# Patient Record
Sex: FEMALE | Race: BLACK OR AFRICAN AMERICAN | NOT HISPANIC OR LATINO | Employment: FULL TIME | URBAN - METROPOLITAN AREA
[De-identification: names, ages, dates, MRNs, and addresses within clinical notes are randomized per-mention and may not be internally consistent; named-entity substitution may affect disease eponyms.]

---

## 2018-01-03 ENCOUNTER — HOSPITAL ENCOUNTER (EMERGENCY)
Facility: HOSPITAL | Age: 28
Discharge: HOME | End: 2018-01-03
Attending: EMERGENCY MEDICINE
Payer: COMMERCIAL

## 2018-01-03 VITALS
SYSTOLIC BLOOD PRESSURE: 138 MMHG | TEMPERATURE: 99 F | HEIGHT: 63 IN | BODY MASS INDEX: 33.66 KG/M2 | DIASTOLIC BLOOD PRESSURE: 84 MMHG | WEIGHT: 190 LBS | OXYGEN SATURATION: 98 % | RESPIRATION RATE: 18 BRPM | HEART RATE: 82 BPM

## 2018-01-03 DIAGNOSIS — R07.9 CHEST PAIN: ICD-10-CM

## 2018-01-03 DIAGNOSIS — M25.512 ACUTE PAIN OF LEFT SHOULDER: Primary | ICD-10-CM

## 2018-01-03 DIAGNOSIS — M79.602 PAIN OF LEFT UPPER EXTREMITY: ICD-10-CM

## 2018-01-03 LAB
B-HCG UR QL: NEGATIVE
CTP QC/QA: YES

## 2018-01-03 PROCEDURE — 96372 THER/PROPH/DIAG INJ SC/IM: CPT

## 2018-01-03 PROCEDURE — 63600175 PHARM REV CODE 636 W HCPCS: Performed by: PHYSICIAN ASSISTANT

## 2018-01-03 PROCEDURE — 93010 ELECTROCARDIOGRAM REPORT: CPT | Mod: ,,, | Performed by: INTERNAL MEDICINE

## 2018-01-03 PROCEDURE — 81025 URINE PREGNANCY TEST: CPT | Performed by: EMERGENCY MEDICINE

## 2018-01-03 PROCEDURE — 93005 ELECTROCARDIOGRAM TRACING: CPT

## 2018-01-03 PROCEDURE — 99284 EMERGENCY DEPT VISIT MOD MDM: CPT | Mod: 25

## 2018-01-03 RX ORDER — ORPHENADRINE CITRATE 30 MG/ML
30 INJECTION INTRAMUSCULAR; INTRAVENOUS
Status: COMPLETED | OUTPATIENT
Start: 2018-01-03 | End: 2018-01-03

## 2018-01-03 RX ORDER — METHOCARBAMOL 500 MG/1
1000 TABLET, FILM COATED ORAL 3 TIMES DAILY PRN
Qty: 30 TABLET | Refills: 0 | Status: SHIPPED | OUTPATIENT
Start: 2018-01-03 | End: 2018-01-08

## 2018-01-03 RX ORDER — IBUPROFEN 600 MG/1
600 TABLET ORAL EVERY 6 HOURS PRN
Qty: 20 TABLET | Refills: 0 | Status: SHIPPED | OUTPATIENT
Start: 2018-01-03 | End: 2018-01-08

## 2018-01-03 RX ORDER — KETOROLAC TROMETHAMINE 30 MG/ML
15 INJECTION, SOLUTION INTRAMUSCULAR; INTRAVENOUS
Status: COMPLETED | OUTPATIENT
Start: 2018-01-03 | End: 2018-01-03

## 2018-01-03 RX ADMIN — KETOROLAC TROMETHAMINE 15 MG: 30 INJECTION, SOLUTION INTRAMUSCULAR at 11:01

## 2018-01-03 RX ADMIN — ORPHENADRINE CITRATE 30 MG: 30 INJECTION INTRAMUSCULAR; INTRAVENOUS at 11:01

## 2018-01-03 NOTE — DISCHARGE INSTRUCTIONS
Please take Ibuprofen and Robaxin as prescribed. Do not take Robaxin prior to driving because it can cause drowsiness.     Follow up with primary care in 2 days.     Return to ER for worsening symptoms, new symptoms or as needed.

## 2018-01-03 NOTE — ED PROVIDER NOTES
"Encounter Date: 1/3/2018    SCRIBE #1 NOTE: I, Cherellemarci Brewer, am scribing for, and in the presence of,  Christie Garcia PA-C. I have scribed the following portions of the note - Other sections scribed: HPI and ROS.       History     Chief Complaint   Patient presents with    Arm Pain     left side arm pain radiating to chest, states "tingling pinching feeling." denies trauma or cardiac hx; states "I think I slept wrong, I slept on this (left) side"     CC: Arm Pain    HPI: This 27 y.o. female with no medical history presents to the ED c/o acute, constant, moderate (7/10) left arm pain radiating from the shoulder to the elbow as well as to the left chest (intermittent; last 30-40 seconds). Pt reports that she awoke this morning unable to move her left arm, noting that she thinks she may have slept on the arm wrong. She states that she has also been experiencing a tingling sensation to the left shoulder and back of the left arm as well as a "pinching tingling feeling" in the chest and adds that she also experiences a "sharp pain" (radiating from the left hand up the arm) when opening the palm of the left hand. She notes that she has experienced similar symptoms to the right side of the back in the past and adds that she has a history of inflammation in the chest (no longer takes medication), but notes that the present symptoms do not feel similar. Pt denies headache, dizziness, lightheadedness, shortness of breath, abdominal pain, nausea, emesis, sweats, leg pain or swelling, nasal congestion, ear pain, sore throat, fever and neck pain. No other associated symptoms. No alleviating factors.           The history is provided by the patient. No  was used.     Review of patient's allergies indicates:   Allergen Reactions    Tramadol Itching     History reviewed. No pertinent past medical history.  Past Surgical History:   Procedure Laterality Date    breast reduction       Family History " "  Problem Relation Age of Onset    Kidney disease Mother     Diabetes Mother     Hypertension Mother     Heart disease Father      Social History   Substance Use Topics    Smoking status: Never Smoker    Smokeless tobacco: Never Used    Alcohol use No     Review of Systems   Constitutional: Negative for fever.   HENT: Negative for sore throat.    Respiratory: Negative for shortness of breath.    Cardiovascular: Positive for chest pain.   Gastrointestinal: Negative for nausea.   Genitourinary: Negative for dysuria.   Musculoskeletal: Negative for back pain.        (+) left arm pain radiating from the left shoulder to the left elbow as well as the left chest    Skin: Negative for rash.   Neurological: Negative for dizziness, weakness, light-headedness and headaches.        (+) tingling sensation to the left shoulder and back of the left arm; (+) "pinching tingling feeling" to the left chest       Physical Exam     Initial Vitals [01/03/18 1015]   BP Pulse Resp Temp SpO2   138/87 89 20 98.5 °F (36.9 °C) 98 %      MAP       104         Physical Exam    Nursing note and vitals reviewed.  Constitutional: She appears well-developed and well-nourished.   HENT:   Head: Normocephalic.   Right Ear: External ear normal.   Left Ear: External ear normal.   Nose: Nose normal.   Mouth/Throat: Oropharynx is clear and moist.   Eyes: Conjunctivae are normal.   Cardiovascular: Normal rate and regular rhythm. Exam reveals no gallop and no friction rub.    No murmur heard.  Pulmonary/Chest: Breath sounds normal. She has no wheezes. She has no rhonchi. She has no rales.   Abdominal: Soft. Bowel sounds are normal. She exhibits no distension. There is no tenderness. There is no rebound, no guarding, no tenderness at McBurney's point and negative Lin's sign.   Musculoskeletal: Normal range of motion.   Diffuse TTP over L  Posterior shoulder and upper arm. Pt moving L arm actively to only 15 degrees in all directions. Passive ROM to " 45, limited due to pain. No erythema or edema.     TTP over left chest wall.    Lymphadenopathy:     She has no cervical adenopathy.   Neurological: She is alert. She has normal strength. No cranial nerve deficit or sensory deficit.   Skin: Skin is warm and dry.   Psychiatric: She has a normal mood and affect.         ED Course   Procedures  Labs Reviewed   POCT URINE PREGNANCY             Medical Decision Making:   Initial Assessment:   Patient is a 27-year-old female who presents for evaluation of constant left shoulder and upper arm pain and tingling and intermittent L sided chest pain. Pain worse with movement.  Patient does report history of similar intermittent chest pain.  She denies history of similar shoulder pains.  Patient is afebrile, well-appearing in no distress.  Exam findings above.  Based on exam I think this is likely musculoskeletal pain.  Patient given Toradol and Norflex in the ED with significant improvement of her symptoms and increased ROM of the left shoulder. X-ray negative for fracture or dislocation. Doubt septic arthritis or frozen shoulder. Patient reports she is no longer having chest pain.  Chest x-ray and EKG unremarkable.  Considered but doubt ACS, pneumothorax.  She discharged home in stable condition with ibuprofen and Robaxin.  PCP follow-up in 2 days.  ER return precautions discussed worsening symptoms or as needed.  Discussed this patient with Dr. Yi who agrees with assessment and plan            Scribe Attestation:   Scribe #1: I performed the above scribed service and the documentation accurately describes the services I performed. I attest to the accuracy of the note.    Attending Attestation:           Physician Attestation for Scribe:  Physician Attestation Statement for Scribe #1: I, Christie Garcia PA-C, reviewed documentation, as scribed by Cherelle Brewer in my presence, and it is both accurate and complete.                 ED Course      Clinical Impression:    The primary encounter diagnosis was Acute pain of left shoulder. Diagnoses of Chest pain and Pain of left upper extremity were also pertinent to this visit.                           Christie Garcia PA-C  01/04/18 1025

## 2018-01-03 NOTE — ED TRIAGE NOTES
"Pt with c/o left arm pain and stiffness that began last night while asleep. Pt states, "My arm felt numb, I couldn't move it. Then a sharp pinching pain went to my chest."  Pt reports pain to left shoulder increased that radiates to the back of her left upper extremity. Pt reports chest pain as intermittent with a pinching feeling, denies SOB, denies trauma to left arm.   "

## 2018-11-08 ENCOUNTER — HOSPITAL ENCOUNTER (EMERGENCY)
Facility: HOSPITAL | Age: 28
Discharge: HOME | End: 2018-11-08
Attending: EMERGENCY MEDICINE

## 2018-11-08 VITALS
RESPIRATION RATE: 15 BRPM | WEIGHT: 190 LBS | DIASTOLIC BLOOD PRESSURE: 68 MMHG | HEIGHT: 63 IN | TEMPERATURE: 98 F | HEART RATE: 69 BPM | SYSTOLIC BLOOD PRESSURE: 128 MMHG | OXYGEN SATURATION: 99 % | BODY MASS INDEX: 33.66 KG/M2

## 2018-11-08 DIAGNOSIS — R10.12 LUQ ABDOMINAL PAIN: ICD-10-CM

## 2018-11-08 DIAGNOSIS — T14.8XXA MUSCLE STRAIN: Primary | ICD-10-CM

## 2018-11-08 DIAGNOSIS — R06.02 SHORTNESS OF BREATH: ICD-10-CM

## 2018-11-08 LAB
ALBUMIN SERPL BCP-MCNC: 3.7 G/DL
ALP SERPL-CCNC: 68 U/L
ALT SERPL W/O P-5'-P-CCNC: 19 U/L
ANION GAP SERPL CALC-SCNC: 8 MMOL/L
AST SERPL-CCNC: 16 U/L
B-HCG UR QL: NEGATIVE
BACTERIA #/AREA URNS HPF: ABNORMAL /HPF
BILIRUB SERPL-MCNC: 0.4 MG/DL
BILIRUB UR QL STRIP: NEGATIVE
BUN SERPL-MCNC: 13 MG/DL
CALCIUM SERPL-MCNC: 9.4 MG/DL
CHLORIDE SERPL-SCNC: 106 MMOL/L
CLARITY UR: CLEAR
CO2 SERPL-SCNC: 26 MMOL/L
COLOR UR: YELLOW
CREAT SERPL-MCNC: 0.8 MG/DL
CTP QC/QA: YES
ERYTHROCYTE [DISTWIDTH] IN BLOOD BY AUTOMATED COUNT: 16 %
EST. GFR  (AFRICAN AMERICAN): >60 ML/MIN/1.73 M^2
EST. GFR  (NON AFRICAN AMERICAN): >60 ML/MIN/1.73 M^2
GLUCOSE SERPL-MCNC: 104 MG/DL
GLUCOSE UR QL STRIP: NEGATIVE
HCT VFR BLD AUTO: 36.2 %
HGB BLD-MCNC: 12.2 G/DL
HGB UR QL STRIP: ABNORMAL
KETONES UR QL STRIP: NEGATIVE
LEUKOCYTE ESTERASE UR QL STRIP: NEGATIVE
LIPASE SERPL-CCNC: 40 U/L
MCH RBC QN AUTO: 23.4 PG
MCHC RBC AUTO-ENTMCNC: 33.7 G/DL
MCV RBC AUTO: 70 FL
MICROSCOPIC COMMENT: ABNORMAL
NITRITE UR QL STRIP: POSITIVE
PH UR STRIP: 6 [PH] (ref 5–8)
PLATELET # BLD AUTO: 228 K/UL
PMV BLD AUTO: 10.5 FL
POTASSIUM SERPL-SCNC: 4.2 MMOL/L
PROT SERPL-MCNC: 7.4 G/DL
PROT UR QL STRIP: NEGATIVE
RBC # BLD AUTO: 5.21 M/UL
RBC #/AREA URNS HPF: 1 /HPF (ref 0–4)
SODIUM SERPL-SCNC: 140 MMOL/L
SP GR UR STRIP: 1.02 (ref 1–1.03)
SQUAMOUS #/AREA URNS HPF: 2 /HPF
URN SPEC COLLECT METH UR: ABNORMAL
UROBILINOGEN UR STRIP-ACNC: NEGATIVE EU/DL
WBC # BLD AUTO: 7.33 K/UL
WBC #/AREA URNS HPF: 3 /HPF (ref 0–5)

## 2018-11-08 PROCEDURE — 80053 COMPREHEN METABOLIC PANEL: CPT

## 2018-11-08 PROCEDURE — 93005 ELECTROCARDIOGRAM TRACING: CPT

## 2018-11-08 PROCEDURE — 25000003 PHARM REV CODE 250: Performed by: EMERGENCY MEDICINE

## 2018-11-08 PROCEDURE — 83690 ASSAY OF LIPASE: CPT

## 2018-11-08 PROCEDURE — 81025 URINE PREGNANCY TEST: CPT | Performed by: EMERGENCY MEDICINE

## 2018-11-08 PROCEDURE — 81000 URINALYSIS NONAUTO W/SCOPE: CPT

## 2018-11-08 PROCEDURE — 85027 COMPLETE CBC AUTOMATED: CPT

## 2018-11-08 PROCEDURE — 93010 ELECTROCARDIOGRAM REPORT: CPT | Mod: ,,, | Performed by: INTERNAL MEDICINE

## 2018-11-08 PROCEDURE — 99283 EMERGENCY DEPT VISIT LOW MDM: CPT

## 2018-11-08 RX ORDER — HYDROCODONE BITARTRATE AND ACETAMINOPHEN 10; 325 MG/1; MG/1
1 TABLET ORAL
Status: DISCONTINUED | OUTPATIENT
Start: 2018-11-08 | End: 2018-11-08 | Stop reason: HOSPADM

## 2018-11-08 RX ORDER — HYOSCYAMINE SULFATE 0.12 MG/1
0.12 TABLET SUBLINGUAL
Status: COMPLETED | OUTPATIENT
Start: 2018-11-08 | End: 2018-11-08

## 2018-11-08 RX ORDER — DIPHENHYDRAMINE HCL 25 MG
25 CAPSULE ORAL
Status: DISCONTINUED | OUTPATIENT
Start: 2018-11-08 | End: 2018-11-08 | Stop reason: HOSPADM

## 2018-11-08 RX ORDER — NITROFURANTOIN 25; 75 MG/1; MG/1
100 CAPSULE ORAL 2 TIMES DAILY
Qty: 10 CAPSULE | Refills: 0 | Status: SHIPPED | OUTPATIENT
Start: 2018-11-08 | End: 2018-11-13

## 2018-11-08 RX ORDER — HYDROCODONE BITARTRATE AND ACETAMINOPHEN 10; 325 MG/1; MG/1
1 TABLET ORAL EVERY 8 HOURS PRN
Qty: 5 TABLET | Refills: 0 | Status: SHIPPED | OUTPATIENT
Start: 2018-11-08 | End: 2022-03-03 | Stop reason: CLARIF

## 2018-11-08 RX ORDER — IBUPROFEN 400 MG/1
400 TABLET ORAL EVERY 6 HOURS PRN
Qty: 20 TABLET | Refills: 0 | Status: SHIPPED | OUTPATIENT
Start: 2018-11-08 | End: 2023-02-06

## 2018-11-08 RX ORDER — IBUPROFEN 400 MG/1
400 TABLET ORAL
Status: COMPLETED | OUTPATIENT
Start: 2018-11-08 | End: 2018-11-08

## 2018-11-08 RX ORDER — CYCLOBENZAPRINE HCL 10 MG
10 TABLET ORAL 3 TIMES DAILY PRN
Qty: 15 TABLET | Refills: 0 | Status: SHIPPED | OUTPATIENT
Start: 2018-11-08 | End: 2018-11-13

## 2018-11-08 RX ORDER — KETOROLAC TROMETHAMINE 30 MG/ML
15 INJECTION, SOLUTION INTRAMUSCULAR; INTRAVENOUS
Status: DISCONTINUED | OUTPATIENT
Start: 2018-11-08 | End: 2018-11-08

## 2018-11-08 RX ADMIN — IBUPROFEN 400 MG: 400 TABLET, FILM COATED ORAL at 07:11

## 2018-11-08 RX ADMIN — HYOSCYAMINE SULFATE 0.12 MG: 0.12 TABLET ORAL; SUBLINGUAL at 07:11

## 2018-11-08 NOTE — ED NOTES
Patient refused pain medication because [facility] [place] [facility].  Dr. Kingsley notified and will write a prescription for pain medication.

## 2018-11-08 NOTE — ED PROVIDER NOTES
Encounter Date: 11/8/2018    SCRIBE #1 NOTE: I, Yanet Stewart, am scribing for, and in the presence of,  Dr. Burnham. I have scribed the entire note.       History     Chief Complaint   Patient presents with    Shortness of Breath     Pt. c/o left sided abdominal pain that began 3 days. Denies N/V/D or dysuria. Pt. is worse with movement.     Amy Davis is a 27 y.o. female who  has no past medical history on file.    The patient presents to the ED due to left sided abdominal pain for the last 2 days.   Patient reports of exacerbating pain during movement and inspiratory breathing.   She denies fever, nausea, vomiting, diarrhea, or dysuria.   Patient reports of previous similar pain, but was not evaluated for it.   Patient reports she just dealt with the pain and her symptoms gradually resolved.   Denies any SOB to me.  Patient LMP is today.      The history is provided by the patient.     Review of patient's allergies indicates:   Allergen Reactions    Tramadol Itching     History reviewed. No pertinent past medical history.  Past Surgical History:   Procedure Laterality Date    breast reduction       Family History   Problem Relation Age of Onset    Kidney disease Mother     Diabetes Mother     Hypertension Mother     Heart disease Father      Social History     Tobacco Use    Smoking status: Never Smoker    Smokeless tobacco: Never Used   Substance Use Topics    Alcohol use: No    Drug use: No     Review of Systems   Constitutional: Negative for chills and fever.   HENT: Negative for sore throat.    Respiratory: Negative for cough and shortness of breath.    Cardiovascular: Negative for chest pain.   Gastrointestinal: Positive for abdominal pain. Negative for diarrhea, nausea and vomiting.   Genitourinary: Negative for dysuria, frequency and urgency.   Musculoskeletal: Negative for back pain.   Skin: Negative for rash and wound.   Neurological: Negative for syncope and weakness.   Hematological: Does  not bruise/bleed easily.   Psychiatric/Behavioral: Negative for agitation, behavioral problems and confusion.       Physical Exam     Initial Vitals [11/08/18 0423]   BP Pulse Resp Temp SpO2   120/76 83 16 98.1 °F (36.7 °C) --      MAP       --         Physical Exam    Nursing note and vitals reviewed.  Constitutional: She appears well-developed and well-nourished. She is not diaphoretic. No distress.   Speaking in full sentences   HENT:   Head: Normocephalic and atraumatic.   Right Ear: External ear normal.   Left Ear: External ear normal.   Nose: Nose normal.   Eyes: EOM are normal. Pupils are equal, round, and reactive to light.   Neck: No tracheal deviation present.   Cardiovascular: Normal rate, regular rhythm, normal heart sounds and intact distal pulses. Exam reveals no gallop and no friction rub.    No murmur heard.  Pulmonary/Chest: Breath sounds normal. No stridor. No respiratory distress. She has no wheezes. She has no rhonchi. She has no rales.   Lungs clear.   Abdominal: Soft. Bowel sounds are normal. She exhibits no distension and no mass. There is tenderness.   Mild left sided abdominal tenderness.  obese abdomen    Musculoskeletal: Normal range of motion. She exhibits no edema.   Neurological: She is alert and oriented to person, place, and time. No cranial nerve deficit. GCS score is 15. GCS eye subscore is 4. GCS verbal subscore is 5. GCS motor subscore is 6.   Skin: Skin is warm and dry. Capillary refill takes less than 2 seconds. No pallor.   Psychiatric: She has a normal mood and affect. Her behavior is normal. Thought content normal.         ED Course   Procedures  Labs Reviewed   CBC WITHOUT DIFFERENTIAL - Abnormal; Notable for the following components:       Result Value    Hematocrit 36.2 (*)     MCV 70 (*)     MCH 23.4 (*)     RDW 16.0 (*)     All other components within normal limits   URINALYSIS, REFLEX TO URINE CULTURE - Abnormal; Notable for the following components:    Occult Blood UA  1+ (*)     Nitrite, UA Positive (*)     All other components within normal limits    Narrative:     Preferred Collection Type->Urine, Clean Catch   URINALYSIS MICROSCOPIC - Abnormal; Notable for the following components:    Bacteria, UA Many (*)     All other components within normal limits    Narrative:     Preferred Collection Type->Urine, Clean Catch   COMPREHENSIVE METABOLIC PANEL   LIPASE   POCT URINE PREGNANCY          Imaging Results          US Pelvis Comp with Transvag NON-OB (xpd (Final result)  Result time 11/08/18 08:33:44    Final result by Ned Smith MD (11/08/18 08:33:44)                 Impression:      No acute abnormality.  Small amount of free fluid in the pelvis may be physiologic.      Electronically signed by: Ned Smith MD  Date:    11/08/2018  Time:    08:33             Narrative:    EXAMINATION:  US PELVIS COMP WITH TRANSVAG NON-OB (XPD)    CLINICAL HISTORY:  LUQ abdominal pain;    TECHNIQUE:  Transabdominal sonography of the pelvis was performed, followed by transvaginal sonography to better evaluate the uterus and ovaries.    COMPARISON:  CT renal stone study same date 07:01    FINDINGS:  Uterus:    *Size: 5.7 x 2.5 x 3.5 cm  *Masses: None  *Endometrium: 5 mm, normal in thickness for pre-menopausal state.  .    Right ovary:    *Size: 4.6 x 1.9 x 3.0 cm  *Appearance: Normal  *Vascular flow: Normal  .    Left ovary:    *Size: 3.6 x 2.1 x 2.8 cm  *Appearance: Normal  *Vascular Flow: Normal  .    Miscellaneous: Small amount of free fluid.                               CT Renal Stone Study ABD Pelvis WO (Final result)  Result time 11/08/18 07:24:35    Final result by Curt Poole MD (11/08/18 07:24:35)                 Impression:      1.  Mild asymmetric prominence of the left ovary with small volume of simple appearing free fluid within the pelvis.  Clinical correlation with patient's physiologic cycle and symptoms is advised.  Further evaluation with pelvic ultrasound as clinically  warranted.    2.  Otherwise, no CT evidence of acute intra-abdominal abnormality, noting normal appearance of the appendix.    3.  Heterogeneous appearance of the hepatic parenchyma which may relate to underlying hepatic steatosis.      Electronically signed by: Curt Poole MD  Date:    11/08/2018  Time:    07:24             Narrative:    EXAMINATION:  CT RENAL STONE STUDY ABD PELVIS WO    CLINICAL HISTORY:  Abdominal pain, unspecified;    TECHNIQUE:  Low dose axial images, sagittal and coronal reformations were obtained from the lung bases to the pubic symphysis.  Contrast was not administered.    COMPARISON:  None    FINDINGS:  The lung bases are unremarkable.  There is no pleural fluid present.  The visualized portions of the heart appear normal.    The liver appears heterogeneous with regions of decreased attenuation possibly relating to hepatic steatosis on this limited noncontrast exam.  The gallbladder shows no evidence of stones or pericholecystic fluid.  There is no intra-or extrahepatic biliary ductal dilatation.    The stomach, spleen, pancreas, and adrenal glands are unremarkable.    The kidneys are normal in size and location. There is no evidence of hydronephrosis or nephrolithiasis.  The urinary bladder demonstrates no significant abnormality. There is mild asymmetric prominence of the left ovary measuring approximately 3.8 cm.  There is a small volume of relatively simple appearing free fluid within the pelvis.  The right adnexa appears within normal limits.    The abdominal aorta is normal in course and caliber without significant atherosclerotic calcifications.The visualized loops of small and large bowel show no evidence of obstruction or inflammation.  The appendix appears within normal limits.  There is no free intraperitoneal air or portal venous gas.    The osseous structures demonstrate no acute abnormalities.  The extraperitoneal soft tissues are unremarkable.                                X-Ray Chest PA And Lateral (Final result)  Result time 11/08/18 07:25:26    Final result by Curt Poole MD (11/08/18 07:25:26)                 Impression:      No radiographic evidence of acute intrathoracic process.      Electronically signed by: Curt Poole MD  Date:    11/08/2018  Time:    07:25             Narrative:    EXAMINATION:  XR CHEST PA AND LATERAL    CLINICAL HISTORY:  Shortness of breath    TECHNIQUE:  PA and lateral views of the chest were performed.    COMPARISON:  None    FINDINGS:  The cardiomediastinal silhouette appears within normal limits.  The lungs are symmetrically aerated without evidence of focal airspace consolidation.  There is no pleural effusion or pneumothorax.  Visualized osseous structures appear intact.                                 Medical Decision Making:   Initial Assessment:   27 y.o female presents with multiple complaints including difficulty breathing, left sided abdominal pain. Vitals on exam are unremarkable. Will obtain basic labs, CXR and reassess.  Differential Diagnosis:   Gastroenteritis, gastritis, ulcer, cholecystitis, gallstones, pancreatitis, ileus, small bowel obstruction, appendicitis, constipation, testicular torsion      Independently Interpreted Test(s):   I have ordered and independently interpreted EKG Reading(s) - see prior notes  Clinical Tests:   Lab Tests: Ordered and Reviewed  The following lab test(s) were unremarkable: CBC, CMP and UPT  Radiological Study: Ordered and Reviewed  Medical Tests: Ordered and Reviewed  ED Management:  Upon re-evaluation, the patient's status has improved.  After complete ED evaluation, clinical impression is most consistent with LUQ abdominal pain, muscle strain. Imaging has been nondiagnostic.  At this time, I feel there is no emergent condition requiring further evaluation or admission. I believe the patient is stable for discharge from the ED. The patient and any additional family present were updated  with test results, overall clinical impression, and recommended further plan of care. All questions were answered. The patient expressed understanding and agreed with current plan for discharge with PCP follow-up within 1 week. Strict return precautions were provided, including N/V, return/worsening of current symptoms or any other concerns.                      ED Course as of Nov 08 0949   Thu Nov 08, 2018   0659 Patient is refusing IV  [LD]   0734 EKG with NSR, rate of 68 bpm. TWIs in leads V1 and III.  Normal conduction, normal intervals, normal ST segments.  No STEMI  [LD]   0902 Patient states that pain has improved.  Tolerating PO well.  [LD]      ED Course User Index  [LD] Lynne Nguyen MD     Clinical Impression:     1. Muscle strain  2. LUQ abdominal pain    Disposition:   Disposition: Discharged  Condition: Stable    I, Lynne Nguyen,  personally performed the services described in this documentation. All medical record entries made by the scribe were at my direction and in my presence.  I have reviewed the chart and agree that the record reflects my personal performance and is accurate and complete. Lynne Nguyen M.D. 9:49 AM11/08/2018                    Lynne Nguyen MD  11/08/18 0950

## 2020-11-12 ENCOUNTER — HOSPITAL ENCOUNTER (EMERGENCY)
Facility: HOSPITAL | Age: 30
Discharge: HOME | End: 2020-11-12
Attending: EMERGENCY MEDICINE

## 2020-11-12 VITALS
BODY MASS INDEX: 33.84 KG/M2 | RESPIRATION RATE: 20 BRPM | WEIGHT: 191 LBS | TEMPERATURE: 98 F | OXYGEN SATURATION: 98 % | DIASTOLIC BLOOD PRESSURE: 84 MMHG | HEIGHT: 63 IN | SYSTOLIC BLOOD PRESSURE: 126 MMHG | HEART RATE: 84 BPM

## 2020-11-12 DIAGNOSIS — M79.10 MUSCULAR PAIN: ICD-10-CM

## 2020-11-12 DIAGNOSIS — R10.9 FLANK PAIN: Primary | ICD-10-CM

## 2020-11-12 LAB
ALBUMIN SERPL BCP-MCNC: 4.2 G/DL (ref 3.5–5.2)
ALP SERPL-CCNC: 67 U/L (ref 55–135)
ALT SERPL W/O P-5'-P-CCNC: 21 U/L (ref 10–44)
ANION GAP SERPL CALC-SCNC: 9 MMOL/L (ref 8–16)
AST SERPL-CCNC: 17 U/L (ref 10–40)
B-HCG UR QL: NEGATIVE
BASOPHILS # BLD AUTO: 0.03 K/UL (ref 0–0.2)
BASOPHILS NFR BLD: 0.4 % (ref 0–1.9)
BILIRUB SERPL-MCNC: 0.5 MG/DL (ref 0.1–1)
BILIRUB UR QL STRIP: NEGATIVE
BUN SERPL-MCNC: 14 MG/DL (ref 6–20)
CALCIUM SERPL-MCNC: 9.8 MG/DL (ref 8.7–10.5)
CHLORIDE SERPL-SCNC: 102 MMOL/L (ref 95–110)
CLARITY UR: CLEAR
CO2 SERPL-SCNC: 29 MMOL/L (ref 23–29)
COLOR UR: YELLOW
CREAT SERPL-MCNC: 0.9 MG/DL (ref 0.5–1.4)
CTP QC/QA: YES
DIFFERENTIAL METHOD: ABNORMAL
EOSINOPHIL # BLD AUTO: 0.2 K/UL (ref 0–0.5)
EOSINOPHIL NFR BLD: 2 % (ref 0–8)
ERYTHROCYTE [DISTWIDTH] IN BLOOD BY AUTOMATED COUNT: 14.6 % (ref 11.5–14.5)
EST. GFR  (AFRICAN AMERICAN): >60 ML/MIN/1.73 M^2
EST. GFR  (NON AFRICAN AMERICAN): >60 ML/MIN/1.73 M^2
GLUCOSE SERPL-MCNC: 80 MG/DL (ref 70–110)
GLUCOSE UR QL STRIP: NEGATIVE
HCT VFR BLD AUTO: 41.5 % (ref 37–48.5)
HGB BLD-MCNC: 14.1 G/DL (ref 12–16)
HGB UR QL STRIP: NEGATIVE
IMM GRANULOCYTES # BLD AUTO: 0.01 K/UL (ref 0–0.04)
IMM GRANULOCYTES NFR BLD AUTO: 0.1 % (ref 0–0.5)
KETONES UR QL STRIP: ABNORMAL
LEUKOCYTE ESTERASE UR QL STRIP: NEGATIVE
LYMPHOCYTES # BLD AUTO: 2.6 K/UL (ref 1–4.8)
LYMPHOCYTES NFR BLD: 35.1 % (ref 18–48)
MCH RBC QN AUTO: 25.1 PG (ref 27–31)
MCHC RBC AUTO-ENTMCNC: 34 G/DL (ref 32–36)
MCV RBC AUTO: 74 FL (ref 82–98)
MONOCYTES # BLD AUTO: 0.5 K/UL (ref 0.3–1)
MONOCYTES NFR BLD: 7.2 % (ref 4–15)
NEUTROPHILS # BLD AUTO: 4.1 K/UL (ref 1.8–7.7)
NEUTROPHILS NFR BLD: 55.2 % (ref 38–73)
NITRITE UR QL STRIP: NEGATIVE
NRBC BLD-RTO: 0 /100 WBC
PH UR STRIP: 5 [PH] (ref 5–8)
PLATELET # BLD AUTO: 279 K/UL (ref 150–350)
PMV BLD AUTO: 11.3 FL (ref 9.2–12.9)
POTASSIUM SERPL-SCNC: 4 MMOL/L (ref 3.5–5.1)
PROT SERPL-MCNC: 8.2 G/DL (ref 6–8.4)
PROT UR QL STRIP: NEGATIVE
RBC # BLD AUTO: 5.61 M/UL (ref 4–5.4)
SODIUM SERPL-SCNC: 140 MMOL/L (ref 136–145)
SP GR UR STRIP: 1.02 (ref 1–1.03)
URN SPEC COLLECT METH UR: ABNORMAL
UROBILINOGEN UR STRIP-ACNC: NEGATIVE EU/DL
WBC # BLD AUTO: 7.46 K/UL (ref 3.9–12.7)

## 2020-11-12 PROCEDURE — 99285 EMERGENCY DEPT VISIT HI MDM: CPT | Mod: 25

## 2020-11-12 PROCEDURE — 81003 URINALYSIS AUTO W/O SCOPE: CPT

## 2020-11-12 PROCEDURE — 25000003 PHARM REV CODE 250: Performed by: NURSE PRACTITIONER

## 2020-11-12 PROCEDURE — 85025 COMPLETE CBC W/AUTO DIFF WBC: CPT

## 2020-11-12 PROCEDURE — 63600175 PHARM REV CODE 636 W HCPCS: Performed by: NURSE PRACTITIONER

## 2020-11-12 PROCEDURE — 96374 THER/PROPH/DIAG INJ IV PUSH: CPT

## 2020-11-12 PROCEDURE — 80053 COMPREHEN METABOLIC PANEL: CPT

## 2020-11-12 PROCEDURE — 96361 HYDRATE IV INFUSION ADD-ON: CPT

## 2020-11-12 PROCEDURE — 81025 URINE PREGNANCY TEST: CPT | Performed by: NURSE PRACTITIONER

## 2020-11-12 RX ORDER — KETOROLAC TROMETHAMINE 30 MG/ML
30 INJECTION, SOLUTION INTRAMUSCULAR; INTRAVENOUS
Status: COMPLETED | OUTPATIENT
Start: 2020-11-12 | End: 2020-11-12

## 2020-11-12 RX ORDER — CYCLOBENZAPRINE HCL 10 MG
10 TABLET ORAL 3 TIMES DAILY PRN
Qty: 9 TABLET | Refills: 0 | Status: SHIPPED | OUTPATIENT
Start: 2020-11-12 | End: 2020-11-15

## 2020-11-12 RX ORDER — KETOROLAC TROMETHAMINE 10 MG/1
10 TABLET, FILM COATED ORAL EVERY 6 HOURS
Qty: 12 TABLET | Refills: 0 | Status: SHIPPED | OUTPATIENT
Start: 2020-11-12 | End: 2020-11-15

## 2020-11-12 RX ADMIN — SODIUM CHLORIDE 1000 ML: 0.9 INJECTION, SOLUTION INTRAVENOUS at 11:11

## 2020-11-12 RX ADMIN — KETOROLAC TROMETHAMINE 30 MG: 30 INJECTION, SOLUTION INTRAMUSCULAR; INTRAVENOUS at 11:11

## 2020-11-12 NOTE — ED PROVIDER NOTES
Encounter Date: 11/12/2020       History     Chief Complaint   Patient presents with    Back Pain     pt c/o nontraumatic right flank pain x2 days; worse with movement and palpation; denies radiation of pain; denies any urinary complaints, no hx of renal stones     29 yr old female presents to the ER with reports of right flank pain that began 2 days ago however worsening this morning. Pt states she feels like something is stabbing her in the flank. No vag dc or bleeding. No hematuria reported. Denies fever. No hx of kidney stones. Denies any injury to the area. PMH of breast reduction otherwise all others negative.     The history is provided by the patient. No  was used.     Review of patient's allergies indicates:   Allergen Reactions    Tramadol Itching     No past medical history on file.  Past Surgical History:   Procedure Laterality Date    breast reduction       Family History   Problem Relation Age of Onset    Kidney disease Mother     Diabetes Mother     Hypertension Mother     Heart disease Father      Social History     Tobacco Use    Smoking status: Never Smoker    Smokeless tobacco: Never Used   Substance Use Topics    Alcohol use: No    Drug use: No     Review of Systems   Constitutional: Negative for fever.   Gastrointestinal: Negative for constipation, diarrhea, nausea and vomiting.   Genitourinary: Positive for flank pain. Negative for vaginal bleeding, vaginal discharge and vaginal pain.   All other systems reviewed and are negative.      Physical Exam     Initial Vitals [11/12/20 1032]   BP Pulse Resp Temp SpO2   126/84 84 20 97.7 °F (36.5 °C) 98 %      MAP       --         Physical Exam    Nursing note and vitals reviewed.  Constitutional: She appears well-developed and well-nourished.   HENT:   Head: Normocephalic.   Right Ear: Hearing and tympanic membrane normal.   Left Ear: Hearing and tympanic membrane normal.   Nose: Nose normal.   Mouth/Throat: Oropharynx is  clear and moist.   Eyes: Lids are normal. Pupils are equal, round, and reactive to light.   Neck: Normal range of motion. No neck rigidity.   Cardiovascular: Normal rate.   Pulmonary/Chest: Breath sounds normal. No respiratory distress. She has no wheezes. She has no rhonchi.   Abdominal: Soft. There is no abdominal tenderness. There is CVA tenderness.   CVA tenderness on the right only. No rash noted.    Musculoskeletal: Normal range of motion.   Neurological: She is alert and oriented to person, place, and time.   Skin: Skin is warm and dry. No rash noted.   Psychiatric: She has a normal mood and affect. Her behavior is normal. Judgment and thought content normal.         ED Course   Procedures  Labs Reviewed   URINALYSIS, REFLEX TO URINE CULTURE - Abnormal; Notable for the following components:       Result Value    Ketones, UA 1+ (*)     All other components within normal limits    Narrative:     Specimen Source->Urine   CBC W/ AUTO DIFFERENTIAL - Abnormal; Notable for the following components:    RBC 5.61 (*)     MCV 74 (*)     MCH 25.1 (*)     RDW 14.6 (*)     All other components within normal limits   COMPREHENSIVE METABOLIC PANEL   POCT URINE PREGNANCY          Imaging Results          CT Renal Stone Study ABD Pelvis WO (Final result)  Result time 11/12/20 12:02:51    Final result by Rodríguez Peñaloza MD (11/12/20 12:02:51)                 Impression:      1. No findings to suggest obstructive uropathy.  Please note, the urinary bladder is decompressed and may account for mild wall thickening however correlation with urinalysis is advised to exclude changes of cystitis.  2. Several additional findings above.      Electronically signed by: Rodríguez Peñaloza MD  Date:    11/12/2020  Time:    12:02             Narrative:    EXAMINATION:  CT RENAL STONE STUDY ABD PELVIS WO    CLINICAL HISTORY:  Flank pain, kidney stone suspected;    TECHNIQUE:  Low dose axial images, sagittal and coronal reformations were  obtained from the lung bases to the pubic symphysis.  Contrast was not administered.    COMPARISON:  11/08/2018    FINDINGS:  Images of the lower thorax are remarkable for minimal dependent atelectasis.    The liver, spleen, pancreas, gallbladder and right adrenal gland have a grossly unremarkable noncontrast appearance.  There is mild thickening of the left adrenal gland, nonspecific.  The stomach is decompressed.  There is no biliary dilation or ascites.  The pancreatic duct is not dilated.  No significant abdominal lymphadenopathy.    The kidneys have a grossly unremarkable noncontrast appearance without hydronephrosis or nephrolithiasis.  The bilateral ureters are unable to be followed in their entirety to the urinary bladder, no definite calculi seen or secondary findings to suggest obstructive uropathy.  The urinary bladder is decompressed.  The uterus and adnexa is grossly unremarkable noting small bilateral ovarian follicles.  There is a small amount of fluid in the deep pelvis.    There is moderate stool in the colon.  The terminal ileum and appendix are unremarkable.  The small bowel is unremarkable.  There are a few scattered shotty periaortic, pericaval, and mesenteric lymph nodes.  There are a few upper limit of normal caliber pericecal lymph nodes, stable.    Degenerative changes are noted of the spine.  No significant inguinal lymphadenopathy.                                 Medical Decision Making:   Initial Assessment:   29 yr old female presents to the ER with reports of right flank pain that began 2 days ago however worsening this morning. Pt states she feels like something is stabbing her in the flank. No vag dc or bleeding. No hematuria reported. Denies fever. No hx of kidney stones. Denies any injury to the area. PMH of breast reduction otherwise all others negative.     The history is provided by the patient. No  was used.     Clinical Tests:   Lab Tests: Ordered and  Reviewed  Radiological Study: Ordered and Reviewed  ED Management:  ED Course as of Nov 12 1225  Thu Nov 12, 2020  1219 Pt reports great improvement in pain after meds given. I have reviewed the results of testing of lab work, urine and ct. Pt notified of the incidental findings concerning the adrenal gland and lymph nodes. Follow up with pcp recommended. She will be treated with Toradol and flexeril at home and is stable for dc.     (DT)    ED Course User Index  (DT) Margaret Morrissey NP                Attending Attestation:     Physician Attestation Statement for NP/PA:   I discussed this assessment and plan of this patient with the NP/PA, but I did not personally examine the patient. The face to face encounter was performed by the NP/PA.                  ED Course as of Nov 12 1332   Thu Nov 12, 2020   1219 Pt reports great improvement in pain after meds given. I have reviewed the results of testing of lab work, urine and ct. Pt notified of the incidental findings concerning the adrenal gland and lymph nodes. Follow up with pcp recommended. She will be treated with Toradol and flexeril at home and is stable for dc.     [DT]   1228 CO2: 29 [DT]      ED Course User Index  [DT] Margaret Morrissey NP            Clinical Impression:     ICD-10-CM ICD-9-CM   1. Flank pain  R10.9 789.09   2. Muscular pain  M79.10 729.1                          ED Disposition Condition    Discharge Stable        ED Prescriptions     Medication Sig Dispense Start Date End Date Auth. Provider    cyclobenzaprine (FLEXERIL) 10 MG tablet Take 1 tablet (10 mg total) by mouth 3 (three) times daily as needed for Muscle spasms. 9 tablet 11/12/2020 11/15/2020 Margaret Morrissey NP    ketorolac (TORADOL) 10 mg tablet Take 1 tablet (10 mg total) by mouth every 6 (six) hours. for 3 days 12 tablet 11/12/2020 11/15/2020 Margaret Morrissey NP        Follow-up Information     Follow up With Specialties Details Why Contact Info    [facility]    Referral  sent to [facility]                                       Margaret Morrissey NP  11/12/20 1223       John Tran MD  11/12/20 [id number]

## 2020-11-13 ENCOUNTER — TELEPHONE (OUTPATIENT)
Dept: ADMINISTRATIVE | Facility: OTHER | Age: 30
End: 2020-11-13

## 2020-11-17 ENCOUNTER — TELEPHONE (OUTPATIENT)
Dept: ADMINISTRATIVE | Facility: OTHER | Age: 30
End: 2020-11-17

## 2021-04-15 ENCOUNTER — PATIENT MESSAGE (OUTPATIENT)
Dept: RESEARCH | Facility: HOSPITAL | Age: 31
End: 2021-04-15

## 2022-02-08 ENCOUNTER — HOSPITAL ENCOUNTER (OUTPATIENT)
Dept: RADIOLOGY | Facility: HOSPITAL | Age: 32
Discharge: HOME | End: 2022-02-08
Attending: FAMILY MEDICINE
Payer: MEDICAID

## 2022-02-08 ENCOUNTER — OFFICE VISIT (OUTPATIENT)
Dept: FAMILY MEDICINE | Facility: CLINIC | Age: 32
End: 2022-02-08
Payer: MEDICAID

## 2022-02-08 VITALS
HEIGHT: 63 IN | HEART RATE: 89 BPM | SYSTOLIC BLOOD PRESSURE: 118 MMHG | DIASTOLIC BLOOD PRESSURE: 72 MMHG | BODY MASS INDEX: 35.7 KG/M2 | WEIGHT: 201.5 LBS | OXYGEN SATURATION: 97 %

## 2022-02-08 DIAGNOSIS — Z76.89 ENCOUNTER TO ESTABLISH CARE WITH NEW DOCTOR: Primary | ICD-10-CM

## 2022-02-08 DIAGNOSIS — Z76.89 ENCOUNTER TO ESTABLISH CARE WITH NEW DOCTOR: ICD-10-CM

## 2022-02-08 DIAGNOSIS — Z01.818 PRE-OP EXAM: ICD-10-CM

## 2022-02-08 DIAGNOSIS — E66.9 OBESITY (BMI 30-39.9): ICD-10-CM

## 2022-02-08 PROCEDURE — 93010 EKG 12-LEAD: ICD-10-PCS | Mod: S$PBB,,, | Performed by: INTERNAL MEDICINE

## 2022-02-08 PROCEDURE — 99204 PR OFFICE/OUTPT VISIT, NEW, LEVL IV, 45-59 MIN: ICD-10-PCS | Mod: S$PBB,,, | Performed by: FAMILY MEDICINE

## 2022-02-08 PROCEDURE — 71046 X-RAY EXAM CHEST 2 VIEWS: CPT | Mod: 26,,, | Performed by: RADIOLOGY

## 2022-02-08 PROCEDURE — 93005 ELECTROCARDIOGRAM TRACING: CPT | Mod: PBBFAC,PO | Performed by: INTERNAL MEDICINE

## 2022-02-08 PROCEDURE — 71046 X-RAY EXAM CHEST 2 VIEWS: CPT | Mod: TC,FY

## 2022-02-08 PROCEDURE — 93010 ELECTROCARDIOGRAM REPORT: CPT | Mod: S$PBB,,, | Performed by: INTERNAL MEDICINE

## 2022-02-08 PROCEDURE — 99204 OFFICE O/P NEW MOD 45 MIN: CPT | Mod: S$PBB,,, | Performed by: FAMILY MEDICINE

## 2022-02-08 PROCEDURE — 99999 PR PBB SHADOW E&M-EST. PATIENT-LVL III: CPT | Mod: PBBFAC,,, | Performed by: FAMILY MEDICINE

## 2022-02-08 PROCEDURE — 99999 PR PBB SHADOW E&M-EST. PATIENT-LVL III: ICD-10-PCS | Mod: PBBFAC,,, | Performed by: FAMILY MEDICINE

## 2022-02-08 PROCEDURE — 71046 XR CHEST PA AND LATERAL: ICD-10-PCS | Mod: 26,,, | Performed by: RADIOLOGY

## 2022-02-08 PROCEDURE — 99213 OFFICE O/P EST LOW 20 MIN: CPT | Mod: PBBFAC,25,PO | Performed by: FAMILY MEDICINE

## 2022-02-08 NOTE — PROGRESS NOTES
(Portions of this note were dictated using voice recognition software and may contain dictation related errors in spelling/grammar/syntax not found on text review)    CC:   Chief Complaint   Patient presents with    [facility]       HPI: 31 y.o. female presented to [facility] and pre op examination. She is scheduled for breast reduction surgery in 3 weeks in [place], [place]. She has medical hx of chronic back pain and obesity, she reports losing weight with life style modification. She needs medical clearance and labs along with EKG and CXR for surgery. She denies any fever, chills,cough, SOB, chest pain,N/V,abdominal pain, changes in bowel habits, urine problems. She has no other concerns.     Past Medical History:   Diagnosis Date    Left lower quadrant pain 8/24/2016       Past Surgical History:   Procedure Laterality Date    breast reduction         Family History   Problem Relation Age of Onset    Kidney disease Mother     Diabetes Mother     Hypertension Mother     Heart disease Father        Social History     Tobacco Use    Smoking status: Never Smoker    Smokeless tobacco: Never Used   Substance Use Topics    Alcohol use: No    Drug use: No       Lab Results   Component Value Date    WBC 6.21 02/08/2022    HGB 13.9 02/08/2022    HCT 41.0 02/08/2022    MCV 71 (L) 02/08/2022     02/08/2022    CHOL 181 02/08/2022    TRIG 85 02/08/2022    HDL 41 02/08/2022    ALT 52 (H) 02/08/2022    AST 42 (H) 02/08/2022    BILITOT 0.8 02/08/2022    ALKPHOS 68 02/08/2022     02/08/2022    K 4.3 02/08/2022     02/08/2022    CREATININE 0.9 02/08/2022    ESTGFRAFRICA >60 02/08/2022    EGFRNONAA >60 02/08/2022    CALCIUM 10.5 02/08/2022    ALBUMIN 4.5 02/08/2022    BUN 12 02/08/2022    CO2 27 02/08/2022    INR 1.0 02/08/2022    HGBA1C 5.8 08/25/2016    LDLCALC 123.0 02/08/2022    GLU 78 02/08/2022             Vital signs reviewed  PE:   APPEARANCE: Well nourished, well developed, in no acute  distress.    HEAD: Normocephalic, atraumatic.  EYES: EOMI.  Conjunctivae noninjected.  NOSE: Mucosa pink. Airway clear.  MOUTH & THROAT: No tonsillar enlargement. No pharyngeal erythema or exudate.   NECK: Supple with no cervical lymphadenopathy.    CHEST: Good inspiratory effort. Lungs clear to auscultation with no wheezes or crackles.  CARDIOVASCULAR: Normal S1, S2. No rubs, murmurs, or gallops.  ABDOMEN: Bowel sounds normal. Not distended. Soft. No tenderness or masses. No organomegaly.  EXTREMITIES: No edema, cyanosis, or clubbing.    Review of Systems   Constitutional: Negative for chills, fatigue and fever.   HENT: Negative.    Respiratory: Negative for cough, shortness of breath and wheezing.    Cardiovascular: Negative for chest pain, palpitations and leg swelling.   Gastrointestinal: Negative.    Genitourinary: Negative.    Musculoskeletal: Negative.    Neurological: Negative.    Psychiatric/Behavioral: Negative.    All other systems reviewed and are negative.      IMPRESSION  1. Encounter to establish care with new doctor    2. Pre-op exam    3. Obesity (BMI 30-39.9)    4. Macromastia        PLAN      1. Encounter to establish care with new doctor  - CBC Auto Differential; Future  - Comprehensive Metabolic Panel; Future  - Lipid Panel; Future  - TSH; Future  - X-Ray Chest PA And Lateral; Future  - Protime-INR; Future  - APTT; Future  - URINALYSIS; Future      2. Pre-op exam  - CBC Auto Differential; Future  - Comprehensive Metabolic Panel; Future  - TSH; Future  - IN OFFICE EKG 12-LEAD (to Muse)  - Hepatitis C Antibody; Future  - HIV 1 / 2 ANTIBODY; Future  - HCG, Quantitative; Future    ACC/AHA 2007 Guidelines for clearance    Step 1: No need for emergency non cardiac surgery  Step 2: No active cardiac conditions  Step 3: No low risk surgery  Step4: Yes - Functional capacity greater than or equal to 4 METs without symptoms - YES - Class IIa, ALEJADNRO B - Proceed with surgery    She is Low to intermediate risk  for surgery. She is medically stable for surgery.      3. Obesity (BMI 30-39.9)  BMI 35  Counseling provided on healthy lifestyle, encouraged to do moderate intensity regular exercise 30 minutes every day 5 days a week, to include vegetables and fruits and cut back on saturated fats and carbohydrates.         SCREENINGS      Immunizations:   needs flu vaccine      Age/demographic appropriate health maintenance:  Needs pap smear      Chacha Shaikh   2/8/2022

## 2022-02-10 ENCOUNTER — TELEPHONE (OUTPATIENT)
Dept: FAMILY MEDICINE | Facility: CLINIC | Age: 32
End: 2022-02-10
Payer: MEDICAID

## 2022-02-10 NOTE — TELEPHONE ENCOUNTER
----- Message from Makayla Neal sent at 2/10/2022  2:51 PM CST -----  Regarding: Results  Type:  Test Results    Who Called: pt    Name of Test (Lab/Mammo/Etc): lab/x ray    Date of Test: 2-8-22    Ordering Provider: Neel    Where the test was performed: [facility]    Would the patient rather a call back or a response via [facility]? Call    Best Call Back Number: [phone]

## 2022-02-10 NOTE — TELEPHONE ENCOUNTER
----- Message from Ana Laura Wong sent at 2/10/2022 12:27 PM CST -----  Regarding: results  Contact: [phone]  Type:  Test Results    Who Called:  self   Name of Test (Lab/Mammo/Etc):  labs, UA and xray for surgery clearance  Date of Test:  02/08  Ordering Provider:    Where the test was performed:  [facility]  Would the patient rather a call back or a response via [facility]?  call  Best Call Back Number:  [phone]  Additional Information:

## 2022-02-10 NOTE — TELEPHONE ENCOUNTER
----- Message from Telma Joe sent at 2/10/2022  3:21 PM CST -----  Contact: Pt  Type:  Test Results    Who Called: pt  Name of Test (Lab/Mammo/Etc): lab  Date of Test: 02/08  Ordering Provider: Dr Shaikh  Where the test was performed: [facility]  Would the patient rather a call back or a response via [facility]? Call   Best Call Back Number: [phone]  Additional Information:      Pt stated she needs this info for procedure

## 2022-02-11 ENCOUNTER — PATIENT MESSAGE (OUTPATIENT)
Dept: FAMILY MEDICINE | Facility: CLINIC | Age: 32
End: 2022-02-11
Payer: MEDICAID

## 2022-02-11 ENCOUNTER — TELEPHONE (OUTPATIENT)
Dept: FAMILY MEDICINE | Facility: CLINIC | Age: 32
End: 2022-02-11
Payer: MEDICAID

## 2022-02-11 NOTE — TELEPHONE ENCOUNTER
----- Message from Ana Laura Wong sent at 2/11/2022  7:32 AM CST -----  Regarding: surgery clearance  Contact: [phone]  Patient is requesting a call back regarding needing her surgery clearance sent to the doctor. The paper work was emailed from her and the Dr's office requesting clearance also.   Would the patient rather a call back or a response via [facility]?  Call   Best Call Back Number:  [phone]  Additional Information:  her deadline is today

## 2022-02-16 ENCOUNTER — PATIENT MESSAGE (OUTPATIENT)
Dept: FAMILY MEDICINE | Facility: CLINIC | Age: 32
End: 2022-02-16
Payer: MEDICAID

## 2022-02-16 ENCOUNTER — TELEPHONE (OUTPATIENT)
Dept: FAMILY MEDICINE | Facility: CLINIC | Age: 32
End: 2022-02-16
Payer: MEDICAID

## 2022-02-16 DIAGNOSIS — Z01.818 PRE-OP EVALUATION: Primary | ICD-10-CM

## 2022-02-17 ENCOUNTER — PATIENT MESSAGE (OUTPATIENT)
Dept: FAMILY MEDICINE | Facility: CLINIC | Age: 32
End: 2022-02-17
Payer: MEDICAID

## 2022-02-17 ENCOUNTER — LAB VISIT (OUTPATIENT)
Dept: LAB | Facility: HOSPITAL | Age: 32
End: 2022-02-17
Attending: FAMILY MEDICINE
Payer: MEDICAID

## 2022-02-17 DIAGNOSIS — Z01.818 PRE-OP EVALUATION: ICD-10-CM

## 2022-02-17 LAB
ESTIMATED AVG GLUCOSE: 120 MG/DL (ref 68–131)
HBA1C MFR BLD: 5.8 % (ref 4–5.6)

## 2022-02-17 PROCEDURE — 36415 COLL VENOUS BLD VENIPUNCTURE: CPT | Performed by: FAMILY MEDICINE

## 2022-02-17 PROCEDURE — 83036 HEMOGLOBIN GLYCOSYLATED A1C: CPT | Performed by: FAMILY MEDICINE

## 2022-03-03 ENCOUNTER — HOSPITAL ENCOUNTER (OUTPATIENT)
Facility: HOSPITAL | Age: 32
Discharge: HOME | End: 2022-03-04
Attending: EMERGENCY MEDICINE | Admitting: HOSPITALIST
Payer: MEDICAID

## 2022-03-03 DIAGNOSIS — R00.0 TACHYCARDIA: ICD-10-CM

## 2022-03-03 DIAGNOSIS — D62 ACUTE BLOOD LOSS ANEMIA: ICD-10-CM

## 2022-03-03 DIAGNOSIS — D64.9 ANEMIA, UNSPECIFIED TYPE: Primary | ICD-10-CM

## 2022-03-03 LAB
ABO GROUP BLD: NORMAL
ALBUMIN SERPL BCP-MCNC: 3.1 G/DL (ref 3.5–5.2)
ALP SERPL-CCNC: 49 U/L (ref 55–135)
ALT SERPL W/O P-5'-P-CCNC: 48 U/L (ref 10–44)
ANION GAP SERPL CALC-SCNC: 7 MMOL/L (ref 8–16)
AST SERPL-CCNC: 40 U/L (ref 10–40)
BASOPHILS # BLD AUTO: 0.04 K/UL (ref 0–0.2)
BASOPHILS NFR BLD: 0.4 % (ref 0–1.9)
BILIRUB SERPL-MCNC: 0.7 MG/DL (ref 0.1–1)
BILIRUB UR QL STRIP: NEGATIVE
BLD GP AB SCN CELLS X3 SERPL QL: NORMAL
BUN SERPL-MCNC: 14 MG/DL (ref 6–20)
CALCIUM SERPL-MCNC: 8.8 MG/DL (ref 8.7–10.5)
CHLORIDE SERPL-SCNC: 105 MMOL/L (ref 95–110)
CLARITY UR: CLEAR
CO2 SERPL-SCNC: 26 MMOL/L (ref 23–29)
COLOR UR: COLORLESS
CREAT SERPL-MCNC: 0.8 MG/DL (ref 0.5–1.4)
CTP QC/QA: YES
DIFFERENTIAL METHOD: ABNORMAL
EOSINOPHIL # BLD AUTO: 0.2 K/UL (ref 0–0.5)
EOSINOPHIL NFR BLD: 1.8 % (ref 0–8)
ERYTHROCYTE [DISTWIDTH] IN BLOOD BY AUTOMATED COUNT: 15.9 % (ref 11.5–14.5)
EST. GFR  (AFRICAN AMERICAN): >60 ML/MIN/1.73 M^2
EST. GFR  (NON AFRICAN AMERICAN): >60 ML/MIN/1.73 M^2
GLUCOSE SERPL-MCNC: 95 MG/DL (ref 70–110)
GLUCOSE UR QL STRIP: NEGATIVE
HCG INTACT+B SERPL-ACNC: 3.5 MIU/ML
HCT VFR BLD AUTO: 21.8 % (ref 37–48.5)
HGB BLD-MCNC: 7.5 G/DL (ref 12–16)
HGB UR QL STRIP: NEGATIVE
IMM GRANULOCYTES # BLD AUTO: 0.35 K/UL (ref 0–0.04)
IMM GRANULOCYTES NFR BLD AUTO: 3.2 % (ref 0–0.5)
KETONES UR QL STRIP: NEGATIVE
LACTATE SERPL-SCNC: 1.8 MMOL/L (ref 0.5–2.2)
LEUKOCYTE ESTERASE UR QL STRIP: NEGATIVE
LIPASE SERPL-CCNC: 27 U/L (ref 4–60)
LYMPHOCYTES # BLD AUTO: 2.7 K/UL (ref 1–4.8)
LYMPHOCYTES NFR BLD: 24.8 % (ref 18–48)
MCH RBC QN AUTO: 24.7 PG (ref 27–31)
MCHC RBC AUTO-ENTMCNC: 34.4 G/DL (ref 32–36)
MCV RBC AUTO: 72 FL (ref 82–98)
MONOCYTES # BLD AUTO: 0.9 K/UL (ref 0.3–1)
MONOCYTES NFR BLD: 7.9 % (ref 4–15)
NEUTROPHILS # BLD AUTO: 6.8 K/UL (ref 1.8–7.7)
NEUTROPHILS NFR BLD: 61.9 % (ref 38–73)
NITRITE UR QL STRIP: NEGATIVE
NRBC BLD-RTO: 1 /100 WBC
PH UR STRIP: 7 [PH] (ref 5–8)
PLATELET # BLD AUTO: 387 K/UL (ref 150–450)
PMV BLD AUTO: 9.6 FL (ref 9.2–12.9)
POTASSIUM SERPL-SCNC: 4.9 MMOL/L (ref 3.5–5.1)
PROT SERPL-MCNC: 6 G/DL (ref 6–8.4)
PROT UR QL STRIP: NEGATIVE
RBC # BLD AUTO: 3.04 M/UL (ref 4–5.4)
RH BLD: NORMAL
SARS-COV-2 RDRP RESP QL NAA+PROBE: NEGATIVE
SODIUM SERPL-SCNC: 138 MMOL/L (ref 136–145)
SP GR UR STRIP: >1.03 (ref 1–1.03)
URN SPEC COLLECT METH UR: ABNORMAL
UROBILINOGEN UR STRIP-ACNC: NEGATIVE EU/DL
WBC # BLD AUTO: 11.04 K/UL (ref 3.9–12.7)

## 2022-03-03 PROCEDURE — 83605 ASSAY OF LACTIC ACID: CPT | Performed by: NURSE PRACTITIONER

## 2022-03-03 PROCEDURE — 25000003 PHARM REV CODE 250: Performed by: NURSE PRACTITIONER

## 2022-03-03 PROCEDURE — 81003 URINALYSIS AUTO W/O SCOPE: CPT | Performed by: NURSE PRACTITIONER

## 2022-03-03 PROCEDURE — 83690 ASSAY OF LIPASE: CPT | Performed by: NURSE PRACTITIONER

## 2022-03-03 PROCEDURE — 99285 EMERGENCY DEPT VISIT HI MDM: CPT | Mod: 25

## 2022-03-03 PROCEDURE — 93010 EKG 12-LEAD: ICD-10-PCS | Mod: ,,, | Performed by: INTERNAL MEDICINE

## 2022-03-03 PROCEDURE — 63600175 PHARM REV CODE 636 W HCPCS: Performed by: EMERGENCY MEDICINE

## 2022-03-03 PROCEDURE — G0378 HOSPITAL OBSERVATION PER HR: HCPCS

## 2022-03-03 PROCEDURE — 96374 THER/PROPH/DIAG INJ IV PUSH: CPT | Mod: 59

## 2022-03-03 PROCEDURE — 86850 RBC ANTIBODY SCREEN: CPT | Performed by: EMERGENCY MEDICINE

## 2022-03-03 PROCEDURE — 80053 COMPREHEN METABOLIC PANEL: CPT | Performed by: NURSE PRACTITIONER

## 2022-03-03 PROCEDURE — 84702 CHORIONIC GONADOTROPIN TEST: CPT | Performed by: NURSE PRACTITIONER

## 2022-03-03 PROCEDURE — 25500020 PHARM REV CODE 255: Performed by: EMERGENCY MEDICINE

## 2022-03-03 PROCEDURE — 86901 BLOOD TYPING SEROLOGIC RH(D): CPT | Performed by: EMERGENCY MEDICINE

## 2022-03-03 PROCEDURE — U0002 COVID-19 LAB TEST NON-CDC: HCPCS | Performed by: NURSE PRACTITIONER

## 2022-03-03 PROCEDURE — 85025 COMPLETE CBC W/AUTO DIFF WBC: CPT | Performed by: NURSE PRACTITIONER

## 2022-03-03 PROCEDURE — 93010 ELECTROCARDIOGRAM REPORT: CPT | Mod: ,,, | Performed by: INTERNAL MEDICINE

## 2022-03-03 PROCEDURE — 96376 TX/PRO/DX INJ SAME DRUG ADON: CPT

## 2022-03-03 PROCEDURE — 93005 ELECTROCARDIOGRAM TRACING: CPT

## 2022-03-03 PROCEDURE — 86900 BLOOD TYPING SEROLOGIC ABO: CPT | Performed by: EMERGENCY MEDICINE

## 2022-03-03 PROCEDURE — 96361 HYDRATE IV INFUSION ADD-ON: CPT

## 2022-03-03 RX ORDER — MORPHINE SULFATE 4 MG/ML
4 INJECTION, SOLUTION INTRAMUSCULAR; INTRAVENOUS
Status: COMPLETED | OUTPATIENT
Start: 2022-03-03 | End: 2022-03-03

## 2022-03-03 RX ORDER — SODIUM CHLORIDE 0.9 % (FLUSH) 0.9 %
10 SYRINGE (ML) INJECTION
Status: DISCONTINUED | OUTPATIENT
Start: 2022-03-03 | End: 2022-03-04 | Stop reason: HOSPADM

## 2022-03-03 RX ORDER — ACETAMINOPHEN 325 MG/1
650 TABLET ORAL EVERY 4 HOURS PRN
Status: DISCONTINUED | OUTPATIENT
Start: 2022-03-03 | End: 2022-03-04 | Stop reason: HOSPADM

## 2022-03-03 RX ORDER — CEPHALEXIN 500 MG/1
500 CAPSULE ORAL EVERY 6 HOURS
COMMUNITY

## 2022-03-03 RX ORDER — ONDANSETRON 2 MG/ML
4 INJECTION INTRAMUSCULAR; INTRAVENOUS EVERY 8 HOURS PRN
Status: DISCONTINUED | OUTPATIENT
Start: 2022-03-03 | End: 2022-03-04 | Stop reason: HOSPADM

## 2022-03-03 RX ADMIN — SODIUM CHLORIDE 1000 ML: 0.9 INJECTION, SOLUTION INTRAVENOUS at 02:03

## 2022-03-03 RX ADMIN — MORPHINE SULFATE 4 MG: 4 INJECTION INTRAVENOUS at 03:03

## 2022-03-03 RX ADMIN — MORPHINE SULFATE 4 MG: 4 INJECTION INTRAVENOUS at 02:03

## 2022-03-03 RX ADMIN — IOHEXOL 100 ML: 350 INJECTION, SOLUTION INTRAVENOUS at 04:03

## 2022-03-03 NOTE — Clinical Note
Diagnosis: Acute blood loss anemia [049598]   Future Attending Provider: JOSÉ MIGUEL BLANTON [[id number]]   Admitting Provider:: JOSÉ MIGUEL BLANTON [[id number]]

## 2022-03-03 NOTE — FIRST PROVIDER EVALUATION
"Medical screening exam completed.  I have conducted a focused provider triage encounter, findings are as follows:    Brief history of present illness:  Reports abdominal lipo in [place] 2/24. Over the past few days, has noticed increased abd distention and states that she "feels really bad."  Denies fever and vomiting.    Vitals:    03/03/22 1231   BP: 126/71   BP Location: Left arm   Patient Position: Standing   Pulse: (!) 126   Resp: 20   Temp: 98.5 °F (36.9 °C)   TempSrc: Oral   SpO2: 100%   Weight: 93 kg (205 lb)   Height: 5' 3" (1.6 m)       Pertinent physical exam:  Ill-appearing. Tachycardic.     Brief workup plan:  Labs, IV fluid bolus    Preliminary workup initiated; this workup will be continued and followed by the physician or advanced practice provider that is assigned to the patient when roomed.  "

## 2022-03-03 NOTE — ED PROVIDER NOTES
"Encounter Date: 3/3/2022    SCRIBE #1 NOTE: I, Alina Pope, am scribing for, and in the presence of, Ye Mendoza MD.       History     Chief Complaint   Patient presents with    Abdominal Pain     Pt had BBL in [place] on 2/24 and flew home yesterday. Pt woke up this morning with tightness to abdomen, finger numbness, headache and dizziness.        31-year-old female presents to the emergency department for evaluation of abdominal pain that onset this morning. She notes associated dizziness and bilateral hand numbness. She describes her abdominal pain as uncomfortable and feels like she is "full of fluid". She reports having a lipo 360 abdominal surgery and brazilian butt lift on 02/24/2022 in [place], [place]. She states she flew back to [place] yesterday after being in a recovery facility. She mentions she had an appointment for a sculpting massage but missed it due to severe abdominal pain. She denies any fever, chills, nausea, vomiting or diarrhea.  She has no other complaints at this time.      The history is provided by the patient.     Review of patient's allergies indicates:  No Known Allergies  Past Medical History:   Diagnosis Date    Left lower quadrant pain 8/24/2016     Past Surgical History:   Procedure Laterality Date    breast reduction      LIPOSUCTION       Family History   Problem Relation Age of Onset    Kidney disease Mother     Diabetes Mother     Hypertension Mother     Heart disease Father      Social History     Tobacco Use    Smoking status: Never Smoker    Smokeless tobacco: Never Used   Substance Use Topics    Alcohol use: No    Drug use: No     Review of Systems   Constitutional: Negative for fever.   HENT: Negative for sore throat.    Respiratory: Negative for shortness of breath.    Cardiovascular: Negative for chest pain.   Gastrointestinal: Positive for abdominal pain. Negative for nausea and vomiting.   Genitourinary: Negative for dysuria.   Musculoskeletal: Negative " for back pain.   Skin: Negative for rash.   Neurological: Positive for dizziness and numbness (bilateral hand). Negative for weakness.   Hematological: Does not bruise/bleed easily.       Physical Exam     Initial Vitals [03/03/22 1231]   BP Pulse Resp Temp SpO2   126/71 (!) 126 20 98.5 °F (36.9 °C) 100 %      MAP       --         Physical Exam    Nursing note and vitals reviewed.  HENT:   Head: Atraumatic.   Eyes: Conjunctivae and EOM are normal.   Neck:   Normal range of motion.  Cardiovascular: Exam reveals no gallop and no friction rub.    No murmur heard.  tachycardic   Pulmonary/Chest: Breath sounds normal. No respiratory distress.   Abdominal: Abdomen is soft. There is no abdominal tenderness.   Musculoskeletal:      Cervical back: Normal range of motion.     Neurological: She is alert and oriented to person, place, and time.   Skin:   Scattered ecchymosis on abdomen and bilateral buttocks/hips with firmness noted to pannus as well as buttocks   Psychiatric: She has a normal mood and affect.         ED Course   Procedures  Labs Reviewed   CBC W/ AUTO DIFFERENTIAL - Abnormal; Notable for the following components:       Result Value    RBC 3.04 (*)     Hemoglobin 7.5 (*)     Hematocrit 21.8 (*)     MCV 72 (*)     MCH 24.7 (*)     RDW 15.9 (*)     Immature Granulocytes 3.2 (*)     Immature Grans (Abs) 0.35 (*)     nRBC 1 (*)     All other components within normal limits   COMPREHENSIVE METABOLIC PANEL - Abnormal; Notable for the following components:    Albumin 3.1 (*)     Alkaline Phosphatase 49 (*)     ALT 48 (*)     Anion Gap 7 (*)     All other components within normal limits   LACTIC ACID, PLASMA   LIPASE   HCG, QUANTITATIVE   HCG, QUANTITATIVE   URINALYSIS, REFLEX TO URINE CULTURE   SARS-COV-2 RDRP GENE    Narrative:     This test utilizes isothermal nucleic acid amplification   technology to detect the SARS-CoV-2 RdRp nucleic acid segment.   The analytical sensitivity (limit of detection) is 125 genome    equivalents/mL.   A POSITIVE result implies infection with the SARS-CoV-2 virus;   the patient is presumed to be contagious.     A NEGATIVE result means that SARS-CoV-2 nucleic acids are not   present above the limit of detection. A NEGATIVE result should be   treated as presumptive. It does not rule out the possibility of   COVID-19 and should not be the sole basis for treatment decisions.   If COVID-19 is strongly suspected based on clinical and exposure   history, re-testing using an alternate molecular assay should be   considered.   This test is only for use under the Food and Drug   Administration s Emergency Use Authorization (EUA).   Commercial kits are provided by [employer].   Performance characteristics of the EUA have been independently   verified by [facility] Department of   Pathology and Laboratory Medicine.   _________________________________________________________________   The authorized Fact Sheet for Healthcare Providers and the authorized Fact   Sheet for Patients of the ID NOW COVID-19 are available on the FDA   website:     https://www.fda.gov/media/211045/download  https://www.fda.gov/media/082597/download           TYPE & SCREEN   GROUP & RH        ECG Results          EKG 12-lead (In process)  Result time 03/03/22 14:11:17    In process by Interface, Lab In [facility] (03/03/22 14:11:17)                 Narrative:    Test Reason : R00.0,    Vent. Rate : 112 BPM     Atrial Rate : 112 BPM     P-R Int : 096 ms          QRS Dur : 080 ms      QT Int : 336 ms       P-R-T Axes : 000 044 018 degrees     QTc Int : 458 ms    Sinus tachycardia with short LA  Low voltage QRS  Borderline Abnormal ECG  When compared with ECG of 08-FEB-2022 12:58,  No significant change was found    Referred By: [id number]   SELF           Confirmed By:                             Imaging Results          CT Abdomen Pelvis With Contrast (Final result)  Result time 03/03/22 17:06:38    Final result by  Rodríguez Peñaloza MD (03/03/22 17:06:38)                 Impression:      1. Recent cosmetic procedure involving the body wall soft tissues, no convincing focal organized collection to suggest abscess.  Scattered regions of subcutaneous emphysema are likely related to procedure rather than gas-forming infection although correlation and follow-up is advised.  Correlation with timing of the procedure is recommended.  2. Findings suggesting hepatic steatosis, correlation with LFTs recommended.  3. Please see above for several additional findings.      Electronically signed by: Rodríguez Peñaloza MD  Date:    03/03/2022  Time:    17:06             Narrative:    EXAMINATION:  CT ABDOMEN PELVIS WITH CONTRAST    CLINICAL HISTORY:  s/p liposuction/butt lift now w/ pain and swelling;    TECHNIQUE:  Low dose axial images, sagittal and coronal reformations were obtained from the lung bases to the pubic symphysis following the IV administration of 100 mL of Omnipaque 350 .  Oral contrast was not given.    COMPARISON:  11/12/2020    FINDINGS:  Please note, patient was scanned in the prone position secondary to recent cosmetic procedure.    Images of the lower thorax are remarkable for bilateral anterior dependent atelectasis.    The liver is hypoattenuating suggesting steatosis, correlation with LFTs recommended.  The spleen, pancreas, gallbladder and right adrenal gland are unremarkable.  There is nodular thickening of the left adrenal gland measuring up to 1.3 cm, nonspecific.  No gastric wall thickening.  The portal vein, splenic vein, SMV, celiac axis and SMA all are patent.  No significant abdominal lymphadenopathy.    The kidneys enhance symmetrically without hydronephrosis or nephrolithiasis.  The bilateral ureters are unremarkable without calculi seen noting the ureters cannot be followed in their entirety to the urinary bladder.  The urinary bladder is grossly unremarkable.  The uterus and adnexa is unremarkable.  No  significant free fluid in the pelvis.    The large bowel is for the most part decompressed.  The terminal ileum and appendix are unremarkable.  The small bowel is grossly unremarkable.  Several scattered shotty periaortic, pericaval, and mesenteric lymph nodes are noted.  No focal organized pelvic fluid collection.    Degenerative changes are noted of the spine.  No significant inguinal lymphadenopathy.    Surgical changes are noted of the body wall circumferentially related to previous cosmetic procedure.  Skin thickening likely reflect sequela of the same however correlation is needed to exclude superimposed cellulitis.  There are scattered regions of more localized fluid, particularly along the anterior abdominal wall however no findings of rim enhancing collection to suggest naun abscess at this time.                                 Medications   sodium chloride 0.9% bolus 1,000 mL (0 mLs Intravenous Stopped 3/3/22 1532)   morphine injection 4 mg (4 mg Intravenous Given 3/3/22 1448)   iohexoL (OMNIPAQUE 350) injection 100 mL (100 mLs Intravenous Given 3/3/22 1636)   morphine injection 4 mg (4 mg Intravenous Given 3/3/22 1537)     Medical Decision Making:   Initial Assessment:   31-year-old female status post liposuction and Brazilian butt lift on 2/24 presents with abdominal pain/swelling and buttock pain.  Patient lying prone in the room because she says it is too painful for a lay on her side or lay supine.  Vital signs notable for tachycardia.  Buttocks are firm to palpation.  Patient's labs show he H&H of 7.5 in 21 which is down from 14 in 41 all month ago.  The remainder of her labs are unremarkable.  CT abdomen and pelvis with p.o. and IV contrast shows no fluid collections.  There are postoperative changes noted and likely expected.  Patient's heart rate now around 100. Patient did mention that during her operation she had to receive cell Saver but is unclear how much or how much blood she lost.  Plan to  admit for serial H&Hs and transfuse as needed.            Scribe Attestation:   Scribe #1: I performed the above scribed service and the documentation accurately describes the services I performed. I attest to the accuracy of the note.                 Clinical Impression:   Final diagnoses:  [R00.0] Tachycardia  [D62] Acute blood loss anemia          ED Disposition Condition    Observation               Ye Mendoza MD  03/03/22 6793

## 2022-03-04 ENCOUNTER — TELEPHONE (OUTPATIENT)
Dept: FAMILY MEDICINE | Facility: CLINIC | Age: 32
End: 2022-03-04
Payer: MEDICAID

## 2022-03-04 VITALS
HEIGHT: 63 IN | WEIGHT: 209.69 LBS | HEART RATE: 86 BPM | BODY MASS INDEX: 37.15 KG/M2 | DIASTOLIC BLOOD PRESSURE: 60 MMHG | RESPIRATION RATE: 20 BRPM | SYSTOLIC BLOOD PRESSURE: 126 MMHG | TEMPERATURE: 97 F | OXYGEN SATURATION: 99 %

## 2022-03-04 PROCEDURE — 25000003 PHARM REV CODE 250: Performed by: NURSE PRACTITIONER

## 2022-03-04 PROCEDURE — G0378 HOSPITAL OBSERVATION PER HR: HCPCS

## 2022-03-04 RX ORDER — OXYCODONE AND ACETAMINOPHEN 5; 325 MG/1; MG/1
1 TABLET ORAL EVERY 4 HOURS PRN
Qty: 10 EACH | Refills: 0 | Status: SHIPPED | OUTPATIENT
Start: 2022-03-04

## 2022-03-04 RX ORDER — TRAMADOL HYDROCHLORIDE 50 MG/1
50 TABLET ORAL ONCE
Status: COMPLETED | OUTPATIENT
Start: 2022-03-04 | End: 2022-03-04

## 2022-03-04 RX ADMIN — TRAMADOL HYDROCHLORIDE 50 MG: 50 TABLET, FILM COATED ORAL at 04:03

## 2022-03-04 NOTE — PLAN OF CARE
TN met with patient via [employer]. Patient is independent and lives with her mother at home. She does not have HH or DME at home. Patient is not driving at this time, but she has family who transport. Patient is aware she is Medicaid Pending. TN requested PCP follow-up from access navigator. Patient encouraged to call with any questions or concerns.   will continue to follow patient through transitions of care and assist with any discharge needs.     PCP follow-up scheduled.    Future Appointments   Date Time Provider Department Center   3/10/2022 11:00 AM Chacha Shaikh MD [facility]         03/04/22 1112   Discharge Assessment   Assessment Type Discharge Planning Brief Assessment   Confirmed/corrected address, phone number and insurance Yes   Confirmed Demographics Correct on Facesheet   Source of Information patient   Lives With parent(s)   Facility Arrived From: Home   Do you expect to return to your current living situation? Yes   Do you have help at home or someone to help you manage your care at home? Yes   Prior to hospitilization cognitive status: Alert/Oriented   Current cognitive status: Alert/Oriented   Walking or Climbing Stairs Difficulty none   Dressing/Bathing Difficulty none   Equipment Currently Used at Home none   Patient currently being followed by outpatient case management? No   Do you take prescription medications? Yes   Do you have prescription coverage? No   Do you have any problems affording any of your prescribed medications? TBD   Who is going to help you get home at discharge? Mother-Karina   How do you get to doctors appointments? family or friend will provide   Are you on dialysis? No   Do you take coumadin? No   Discharge Plan A Home   Discharge Plan B Home with family   DME Needed Upon Discharge  none   Discharge Plan discussed with: Patient   Discharge Barriers Identified Un[employer]   Relationship/Environment   Name(s) of Who Lives With Patient Karina  [id number]     Anne Toney RN    [phone]

## 2022-03-04 NOTE — SUBJECTIVE & OBJECTIVE
Past Medical History:   Diagnosis Date    Left lower quadrant pain 8/24/2016       Past Surgical History:   Procedure Laterality Date    breast reduction      LIPOSUCTION         Review of patient's allergies indicates:  No Known Allergies    No current facility-administered medications on file prior to encounter.     Current Outpatient Medications on File Prior to Encounter   Medication Sig    ibuprofen (ADVIL,MOTRIN) 400 MG tablet Take 1 tablet (400 mg total) by mouth every 6 (six) hours as needed. (Patient not taking: Reported on 2/8/2022)    oxyCODONE-acetaminophen (PERCOCET) 5-325 mg per tablet Take 1 tablet by mouth every 4 (four) hours as needed for Pain. I have one pill left    [DISCONTINUED] HYDROcodone-acetaminophen (NORCO)  mg per tablet Take 1 tablet by mouth every 8 (eight) hours as needed for Pain. (Patient not taking: Reported on 2/8/2022)     Family History       Problem Relation (Age of Onset)    Diabetes Mother    Heart disease Father    Hypertension Mother    Kidney disease Mother          Tobacco Use    Smoking status: Never Smoker    Smokeless tobacco: Never Used   Substance and Sexual Activity    Alcohol use: No    Drug use: No    Sexual activity: Never     Review of Systems   Constitutional:  Positive for activity change. Negative for fever.   HENT:  Negative for congestion, dental problem and trouble swallowing.    Eyes:  Negative for redness and visual disturbance.   Respiratory:  Negative for apnea, cough and shortness of breath.    Cardiovascular:  Negative for chest pain and palpitations.   Gastrointestinal:  Positive for abdominal distention and abdominal pain. Negative for nausea and vomiting.   Endocrine: Negative for polydipsia and polyuria.   Genitourinary:  Negative for difficulty urinating and enuresis.   Musculoskeletal:  Positive for back pain. Negative for arthralgias.   Skin:  Negative for color change and rash.   Allergic/Immunologic: Negative for food allergies.    Neurological:  Positive for dizziness, weakness and numbness.   Psychiatric/Behavioral:  Negative for agitation.    Objective:     Vital Signs (Most Recent):  Temp: 98.5 °F (36.9 °C) (03/03/22 1231)  Pulse: 97 (03/03/22 1832)  Resp: (!) 25 (03/03/22 1602)  BP: 117/69 (03/03/22 1802)  SpO2: 100 % (03/03/22 1832)   Vital Signs (24h Range):  Temp:  [98.5 °F (36.9 °C)-99 °F (37.2 °C)] 98.5 °F (36.9 °C)  Pulse:  [] 97  Resp:  [14-25] 25  SpO2:  [98 %-100 %] 100 %  BP: (117-126)/(58-71) 117/69     Weight: 93 kg (205 lb)  Body mass index is 36.31 kg/m².    Physical Exam  HENT:      Head: Atraumatic.      Right Ear: Tympanic membrane normal.      Left Ear: Tympanic membrane normal.      Nose: Nose normal.      Mouth/Throat:      Mouth: Mucous membranes are moist.   Eyes:      Pupils: Pupils are equal, round, and reactive to light.   Cardiovascular:      Rate and Rhythm: Normal rate.      Pulses: Normal pulses.   Pulmonary:      Effort: Pulmonary effort is normal.   Abdominal:      General: There is distension.      Tenderness: There is abdominal tenderness. There is guarding.   Musculoskeletal:         General: Normal range of motion.   Skin:     General: Skin is warm and dry.      Capillary Refill: Capillary refill takes less than 2 seconds.   Neurological:      Mental Status: She is alert and oriented to person, place, and time.   Psychiatric:         Mood and Affect: Mood normal.         CRANIAL NERVES     CN III, IV, VI   Pupils are equal, round, and reactive to light.     Significant Labs: All pertinent labs within the past 24 hours have been reviewed.  Recent Lab Results         03/03/22  1444   03/03/22  1429   03/03/22  1333        ABO A           Albumin     3.1       Alkaline Phosphatase     49       ALT     48       Anion Gap     7       AST     40       Baso #     0.04       Basophil %     0.4       BILIRUBIN TOTAL     0.7  Comment: For infants and newborns, interpretation of results should be based  on  gestational age, weight and in agreement with clinical  observations.    Premature Infant recommended reference ranges:  Up to 24 hours.............<8.0 mg/dL  Up to 48 hours............<12.0 mg/dL  3-5 days..................<15.0 mg/dL  6-29 days.................<15.0 mg/dL         BUN     14       Calcium     8.8       Chloride     105       CO2     26       Creatinine     0.8       Differential Method     Automated       eGFR if      >60       eGFR if non      >60  Comment: Calculation used to obtain the estimated glomerular filtration  rate (eGFR) is the CKD-EPI equation.          Eos #     0.2       Eosinophil %     1.8       Glucose     95       Gran # (ANC)     6.8       Gran %     61.9       HCG Quant     3.5  Comment: Quantitative HCG Reference Ranges:  Males........................<5.0 mIU/mL  Non-Pregnant Females.........<5.0 mIU/mL  Pregnancy:  Weeks post-LMP...............Range (mIU/mL)  1-10  weeks.....................202-231,000  11-15 weeks..................22,536-234,990  16-22 weeks...................8,007-50,064  23-40 weeks...................1,600-49,413    NOTE:  This assay is not FDA approved for tumor screening,   diagnosis, or monitoring.         Hematocrit     21.8       Hemoglobin     7.5       Immature Grans (Abs)     0.35  Comment: Mild elevation in immature granulocytes is non specific and   can be seen in a variety of conditions including stress response,   acute inflammation, trauma and pregnancy. Correlation with other   laboratory and clinical findings is essential.         Immature Granulocytes     3.2       INDIRECT TAE NEG  Comment: @03/03/22 15:48 by ZULMA:           Lactate, Smith     1.8  Comment: Falsely low lactic acid results can be found in samples   containing >=13.0 mg/dL total bilirubin and/or >=3.5 mg/dL   direct bilirubin.         Lipase     27       Lymph #     2.7       Lymph %     24.8       MCH     24.7       MCHC     34.4       MCV      72       Mono #     0.9       Mono %     7.9       MPV     9.6       nRBC     1       Platelets     387       Potassium     4.9       PROTEIN TOTAL     6.0        Acceptable   Yes         RBC     3.04       RDW     15.9       Rh Type POS           SARS-CoV-2 RNA, Amplification, Qual   Negative         Sodium     138       WBC     11.04               Significant Imaging: I have reviewed all pertinent imaging results/findings within the past 24 hours.

## 2022-03-04 NOTE — NURSING
"Lab here to draw lab, pt refused at this time states she is in pain and "I'm not refusing I'm in pain," pt refused tylenol, no other pain med ordered, secured chat sent to Chele Coppola NP patient requesting pain med awaiting response  "

## 2022-03-04 NOTE — PLAN OF CARE
Discharge orders noted. AVS prepared with medication list, importance of medication compliance, follow up appointments, diet, home care instructions, treatment plan, self management, and when to seek medical attention. Detailed clinical reference list attached. Bedside nurse Judith will print AVS  and review with patient at bedside.

## 2022-03-04 NOTE — HPI
Amy Davis is a 31-year-old female with no past medical history. Her past surgical history includes a Breast reduction (2016) and Liposuction with [place] Butt Lift in [place] on 2/24/2022 in which she returned home to [place], [place] one day ago. She reports to the ED today with a cc of abdominal pain that started earlier in the day. Associated symptoms include dizziness and bilateral hand numbness. She is lying prone as she reports it is the only way she is comfortable. She does admit missing an appointment to have fluid drained via sculpting massage since her return home. She denies fever, chills, nausea, vomiting, or diarrhea.     ED workup revealed Hgb 7.5 down from 14 a month ago, Hct 21.8 down from 41, Albumin 3.1, Alk Phos 49, ALT 48, Anion gap 7, EKG sinus tachycardia 112 rate, CT abdomen Images of the lower thorax are remarkable for bilateral anterior dependent atelectasis. There are scattered regions of more localized fluid, particularly along the anterior abdominal wall however no findings of rim enhancing collection to suggest naun abscess at this time. Admitted to [facility] for serial H&Hs and transfusion as needed.

## 2022-03-04 NOTE — NURSING
Pt requesting to leave AMA charge nurse in the room talking to  The patient however she is still requesting to leave, she wants the IV taken out and wants to go home, NP notified,

## 2022-03-04 NOTE — ASSESSMENT & PLAN NOTE
Hgb 7.5 down from 14 preop  Hct 21.8 down from 41 preop  Serial H&H  Type and Screen  Tranfuse Hgb <7

## 2022-03-04 NOTE — PLAN OF CARE
Discharge orders noted. Patient is discharged to home. No DME or HH ordered. PCP follow-up scheduled. Patient has transport home..    Patient Contacts    Name Relation Home Work Mobile   Karina Davis   [phone]     Future Appointments   Date Time Provider Department Center   3/10/2022 11:00 AM Chacha Shaikh MD [facility]       03/04/22 1118   Final Note   Assessment Type Final Discharge Note   Anticipated Discharge Disposition Home   Hospital Resources/Appts/Education Provided Appointments scheduled by Navigator/Coordinator   Post-Acute Status   Discharge Delays None known at this time     Anne Toney RN    [phone]

## 2022-03-04 NOTE — TELEPHONE ENCOUNTER
----- Message from Brenda Barnes sent at 3/4/2022 11:09 AM CST -----  Regarding: Hosp f/u appt  Patient is preparing for discharge from [facility] and is requiring a hospital follow up appointment with  within 7 days.  If possible, can you please assist with scheduling this patient and message me back?    Dx Anemia    Thank you,    Brenda Barnes  High End Access Navigator/[facility] discharge project   [facility]

## 2022-03-04 NOTE — NURSING
Patient discharged with all patient belongings. IV removed, no bleeding noted. Discharge instructions and educational materials reviewed with patient. Patient verbalizes clear understanding of all materials. No acute distress noted upon discharge.

## 2022-03-04 NOTE — ED NOTES
Patient is alert, awake and oriented x4. Bilateral breath sounds are clear, equal and unlabored.  Generalized edema noted. Patient denies any current complaints of pain. Patient is laying in prone position due to recently BBL and 360 surgery. Call light within reach of patient, bed is in lowest position and one side rail is in up position. Boyfriend at bedside.  Patient is currently stable.

## 2022-03-04 NOTE — NURSING
Patient reporting pain to buttock and rating it a 10/10. Dr. Loya notified. Physician states that she will be rounding on patient.

## 2022-03-04 NOTE — PLAN OF CARE
VN cued into room to complete admit assessment. VIP model introduced; VN working alongside bedside treatment team.  Plan of care reviewed with patient. Patient informed of fall risk, fall precautions, call light within reach, side rails x2 elevated. Patient notified to ask staff for assistance. Patient verbalized complete understanding. Time allowed for questions. Will continue to monitor and intervene as needed. Chart review complete.

## 2022-03-04 NOTE — H&P
[facility] Emergency [facility] Medicine  History & Physical    Patient Name: Amy Davis  MRN: [id number]  Patient Class: OP- Observation  Admission Date: 3/3/2022  Attending Physician: Kiah Loya MD  Primary Care Provider: Chacha Shaikh MD         Patient information was obtained from patient, past medical records and ER records.     Subjective:     Principal Problem:Abdominal Pain    Chief Complaint:   Chief Complaint   Patient presents with    Abdominal Pain     Pt had BBL in [place] on 2/24 and flew home yesterday. Pt woke up this morning with tightness to abdomen, finger numbness, headache and dizziness.           HPI: Amy Davis is a 31-year-old female with no past medical history. Her past surgical history includes a Breast reduction (2016) and Liposuction with [place] Butt Lift in [place] on 2/24/2022 in which she returned home to [place], [place] one day ago. She reports to the ED today with a cc of abdominal pain that started earlier in the day. Associated symptoms include dizziness and bilateral hand numbness. She is lying prone as she reports it is the only way she is comfortable. She does admit missing an appointment to have fluid drained via sculpting massage since her return home. She denies fever, chills, nausea, vomiting, or diarrhea.     ED workup revealed Hgb 7.5 down from 14 a month ago, Hct 21.8 down from 41, Albumin 3.1, Alk Phos 49, ALT 48, Anion gap 7, EKG sinus tachycardia 112 rate, CT abdomen Images of the lower thorax are remarkable for bilateral anterior dependent atelectasis. There are scattered regions of more localized fluid, particularly along the anterior abdominal wall however no findings of rim enhancing collection to suggest naun abscess at this time. Admitted to [facility] Medicine for serial H&Hs and transfusion as needed.      Past Medical History:   Diagnosis Date    Left lower quadrant pain 8/24/2016       Past Surgical History:   Procedure Laterality Date     breast reduction      LIPOSUCTION         Review of patient's allergies indicates:  No Known Allergies    No current facility-administered medications on file prior to encounter.     Current Outpatient Medications on File Prior to Encounter   Medication Sig    ibuprofen (ADVIL,MOTRIN) 400 MG tablet Take 1 tablet (400 mg total) by mouth every 6 (six) hours as needed. (Patient not taking: Reported on 2/8/2022)    oxyCODONE-acetaminophen (PERCOCET) 5-325 mg per tablet Take 1 tablet by mouth every 4 (four) hours as needed for Pain. I have one pill left    [DISCONTINUED] HYDROcodone-acetaminophen (NORCO)  mg per tablet Take 1 tablet by mouth every 8 (eight) hours as needed for Pain. (Patient not taking: Reported on 2/8/2022)     Family History       Problem Relation (Age of Onset)    Diabetes Mother    Heart disease Father    Hypertension Mother    Kidney disease Mother          Tobacco Use    Smoking status: Never Smoker    Smokeless tobacco: Never Used   Substance and Sexual Activity    Alcohol use: No    Drug use: No    Sexual activity: Never     Review of Systems   Constitutional:  Positive for activity change. Negative for fever.   HENT:  Negative for congestion, dental problem and trouble swallowing.    Eyes:  Negative for redness and visual disturbance.   Respiratory:  Negative for apnea, cough and shortness of breath.    Cardiovascular:  Negative for chest pain and palpitations.   Gastrointestinal:  Positive for abdominal distention and abdominal pain. Negative for nausea and vomiting.   Endocrine: Negative for polydipsia and polyuria.   Genitourinary:  Negative for difficulty urinating and enuresis.   Musculoskeletal:  Positive for back pain. Negative for arthralgias.   Skin:  Negative for color change and rash.   Allergic/Immunologic: Negative for food allergies.   Neurological:  Positive for dizziness, weakness and numbness.   Psychiatric/Behavioral:  Negative for agitation.    Objective:      Vital Signs (Most Recent):  Temp: 98.5 °F (36.9 °C) (03/03/22 1231)  Pulse: 97 (03/03/22 1832)  Resp: (!) 25 (03/03/22 1602)  BP: 117/69 (03/03/22 1802)  SpO2: 100 % (03/03/22 1832)   Vital Signs (24h Range):  Temp:  [98.5 °F (36.9 °C)-99 °F (37.2 °C)] 98.5 °F (36.9 °C)  Pulse:  [] 97  Resp:  [14-25] 25  SpO2:  [98 %-100 %] 100 %  BP: (117-126)/(58-71) 117/69     Weight: 93 kg (205 lb)  Body mass index is 36.31 kg/m².    Physical Exam  HENT:      Head: Atraumatic.      Right Ear: Tympanic membrane normal.      Left Ear: Tympanic membrane normal.      Nose: Nose normal.      Mouth/Throat:      Mouth: Mucous membranes are moist.   Eyes:      Pupils: Pupils are equal, round, and reactive to light.   Cardiovascular:      Rate and Rhythm: Normal rate.      Pulses: Normal pulses.   Pulmonary:      Effort: Pulmonary effort is normal.   Abdominal:      General: There is distension.      Tenderness: There is abdominal tenderness. There is guarding.   Musculoskeletal:         General: Normal range of motion.   Skin:     General: Skin is warm and dry.      Capillary Refill: Capillary refill takes less than 2 seconds.   Neurological:      Mental Status: She is alert and oriented to person, place, and time.   Psychiatric:         Mood and Affect: Mood normal.         CRANIAL NERVES     CN III, IV, VI   Pupils are equal, round, and reactive to light.     Significant Labs: All pertinent labs within the past 24 hours have been reviewed.  Recent Lab Results         03/03/22  1444   03/03/22  1429   03/03/22  1333        ABO A           Albumin     3.1       Alkaline Phosphatase     49       ALT     48       Anion Gap     7       AST     40       Baso #     0.04       Basophil %     0.4       BILIRUBIN TOTAL     0.7  Comment: For infants and newborns, interpretation of results should be based  on gestational age, weight and in agreement with clinical  observations.    Premature Infant recommended reference ranges:  Up to 24  hours.............<8.0 mg/dL  Up to 48 hours............<12.0 mg/dL  3-5 days..................<15.0 mg/dL  6-29 days.................<15.0 mg/dL         BUN     14       Calcium     8.8       Chloride     105       CO2     26       Creatinine     0.8       Differential Method     Automated       eGFR if      >60       eGFR if non      >60  Comment: Calculation used to obtain the estimated glomerular filtration  rate (eGFR) is the CKD-EPI equation.          Eos #     0.2       Eosinophil %     1.8       Glucose     95       Gran # (ANC)     6.8       Gran %     61.9       HCG Quant     3.5  Comment: Quantitative HCG Reference Ranges:  Males........................<5.0 mIU/mL  Non-Pregnant Females.........<5.0 mIU/mL  Pregnancy:  Weeks post-LMP...............Range (mIU/mL)  1-10  weeks.....................202-231,000  11-15 weeks..................22,536-234,990  16-22 weeks...................8,007-50,064  23-40 weeks...................1,600-49,413    NOTE:  This assay is not FDA approved for tumor screening,   diagnosis, or monitoring.         Hematocrit     21.8       Hemoglobin     7.5       Immature Grans (Abs)     0.35  Comment: Mild elevation in immature granulocytes is non specific and   can be seen in a variety of conditions including stress response,   acute inflammation, trauma and pregnancy. Correlation with other   laboratory and clinical findings is essential.         Immature Granulocytes     3.2       INDIRECT TAE NEG  Comment: @03/03/22 15:48 by ZULMA:           Lactate, Smith     1.8  Comment: Falsely low lactic acid results can be found in samples   containing >=13.0 mg/dL total bilirubin and/or >=3.5 mg/dL   direct bilirubin.         Lipase     27       Lymph #     2.7       Lymph %     24.8       MCH     24.7       MCHC     34.4       MCV     72       Mono #     0.9       Mono %     7.9       MPV     9.6       nRBC     1       Platelets     387       Potassium      4.9       PROTEIN TOTAL     6.0        Acceptable   Yes         RBC     3.04       RDW     15.9       Rh Type POS           SARS-CoV-2 RNA, Amplification, Qual   Negative         Sodium     138       WBC     11.04               Significant Imaging: I have reviewed all pertinent imaging results/findings within the past 24 hours.    Assessment/Plan:     Acute blood loss anemia    Hgb 7.5 down from 14 preop  Hct 21.8 down from 41 preop  Serial H&H  Type and Screen  Tranfuse Hgb <7      VTE Risk Mitigation (From admission, onward)    Lovenox 40 mg SQ daily             Heather Little, NP  Department of Hospital Medicine   [place] - Emergency Dept

## 2022-03-11 NOTE — HOSPITAL COURSE
Patient monitor closely during observation stay.  She received pain control for postop surgical pain.  Patient wished to leave against medical advice however was instructed to allow for continued monitoring.  Was orders for CBC however declined laboratory collection.  She is doing well this morning and is medically stable for discharge.  She reports pain is controlled with medication.  She is tolerating diet and ambulating in room.

## 2022-03-11 NOTE — DISCHARGE SUMMARY
[facility] Medicine  Discharge Summary      Patient Name: Amy Davis  MRN: [id number]  Patient Class: OP- Observation  Admission Date: 3/3/2022  Hospital Length of Stay: 0 days  Discharge Date and Time: 3/4/2022  2:00 PM  Attending Physician: No att. providers found   Discharging Provider: Adalgisa Cheema NP  Primary Care Provider: Chacha Shaikh MD      HPI:   Amy Davis is a 31-year-old female with no past medical history. Her past surgical history includes a Breast reduction (2016) and Liposuction with [place] Butt Lift in [place] on 2/24/2022 in which she returned home to [place], [place] one day ago. She reports to the ED today with a cc of abdominal pain that started earlier in the day. Associated symptoms include dizziness and bilateral hand numbness. She is lying prone as she reports it is the only way she is comfortable. She does admit missing an appointment to have fluid drained via sculpting massage since her return home. She denies fever, chills, nausea, vomiting, or diarrhea.     ED workup revealed Hgb 7.5 down from 14 a month ago, Hct 21.8 down from 41, Albumin 3.1, Alk Phos 49, ALT 48, Anion gap 7, EKG sinus tachycardia 112 rate, CT abdomen Images of the lower thorax are remarkable for bilateral anterior dependent atelectasis. There are scattered regions of more localized fluid, particularly along the anterior abdominal wall however no findings of rim enhancing collection to suggest naun abscess at this time. Admitted to [facility] Medicine for serial H&Hs and transfusion as needed.      * No surgery found *      Hospital Course:   Patient monitor closely during observation stay.  She received pain control for postop surgical pain.  Patient wished to leave against medical advice however was instructed to allow for continued monitoring.  Was orders for CBC however declined laboratory collection.  She is doing well this morning and is medically stable for discharge.   She reports pain is controlled with medication.  She is tolerating diet and ambulating in room.        Goals of Care Treatment Preferences:  Code Status: Full Code      Consults:     No new Assessment & Plan notes have been filed under this hospital service since the last note was generated.  Service: Hospital Medicine    Final Active Diagnoses:    Diagnosis Date Noted POA    PRINCIPAL PROBLEM:  Acute blood loss anemia [D62] 03/03/2022 Yes      Problems Resolved During this Admission:       Discharged Condition: stable    Disposition: Home or Self Care    Follow Up:    Patient Instructions:      Diet Cardiac     Notify your health care provider if you experience any of the following:  difficulty breathing or increased cough     Notify your health care provider if you experience any of the following:  severe uncontrolled pain     Notify your health care provider if you experience any of the following:  temperature >100.4     Notify your health care provider if you experience any of the following:  redness, tenderness, or signs of infection (pain, swelling, redness, odor or green/yellow discharge around incision site)     Activity as tolerated       Significant Diagnostic Studies: Labs: BMP: No results for input(s): GLU, NA, K, CL, CO2, BUN, CREATININE, CALCIUM, MG in the last 48 hours. and CMP No results for input(s): NA, K, CL, CO2, GLU, BUN, CREATININE, CALCIUM, PROT, ALBUMIN, BILITOT, ALKPHOS, AST, ALT, ANIONGAP, ESTGFRAFRICA, EGFRNONAA in the last 48 hours.    Pending Diagnostic Studies:     None         Medications:  Reconciled Home Medications:      Medication List      CONTINUE taking these medications    cephALEXin 500 MG capsule  Commonly known as: KEFLEX  Take 500 mg by mouth every 6 (six) hours.     ibuprofen 400 MG tablet  Commonly known as: ADVIL,MOTRIN  Take 1 tablet (400 mg total) by mouth every 6 (six) hours as needed.     oxyCODONE-acetaminophen 5-325 mg per tablet  Commonly known as: PERCOCET  Take  1 tablet by mouth every 4 (four) hours as needed for Pain. I have one pill left            Indwelling Lines/Drains at time of discharge:   Lines/Drains/Airways     None                 Time spent on the discharge of patient: 30 minutes         Adalgisa Cheema NP  Department of Hospital Medicine  [place] [facility]

## 2022-05-31 ENCOUNTER — PATIENT MESSAGE (OUTPATIENT)
Dept: ADMINISTRATIVE | Facility: HOSPITAL | Age: 32
End: 2022-05-31
Payer: MEDICAID

## 2022-08-24 ENCOUNTER — PATIENT MESSAGE (OUTPATIENT)
Dept: ADMINISTRATIVE | Facility: HOSPITAL | Age: 32
End: 2022-08-24
Payer: MEDICAID

## 2022-10-10 ENCOUNTER — PATIENT MESSAGE (OUTPATIENT)
Dept: ADMINISTRATIVE | Facility: HOSPITAL | Age: 32
End: 2022-10-10
Payer: MEDICAID

## 2022-10-24 ENCOUNTER — PATIENT MESSAGE (OUTPATIENT)
Dept: ADMINISTRATIVE | Facility: HOSPITAL | Age: 32
End: 2022-10-24
Payer: MEDICAID

## 2022-12-15 ENCOUNTER — TELEPHONE (OUTPATIENT)
Dept: FAMILY MEDICINE | Facility: CLINIC | Age: 32
End: 2022-12-15
Payer: MEDICAID

## 2022-12-15 NOTE — TELEPHONE ENCOUNTER
----- Message from Susie Tariq sent at 12/15/2022  9:42 AM CST -----  Contact: Patient  Type:  Sooner Appointment Request    Name of Caller:Patient   When is the first available appointment?01/23/2023  Symptoms:headache /pre diabetic   Would the patient rather a call back or a response via [facility]? Call back  Best Call Back Number:[phone]  Additional Information: Please assist

## 2023-01-17 ENCOUNTER — PATIENT MESSAGE (OUTPATIENT)
Dept: ADMINISTRATIVE | Facility: HOSPITAL | Age: 33
End: 2023-01-17
Payer: MEDICAID

## 2023-01-27 ENCOUNTER — OCCUPATIONAL HEALTH (OUTPATIENT)
Dept: URGENT CARE | Facility: CLINIC | Age: 33
End: 2023-01-27

## 2023-01-27 DIAGNOSIS — Z02.83 ENCOUNTER FOR DRUG SCREENING: Primary | ICD-10-CM

## 2023-01-27 PROCEDURE — 80305 DRUG TEST PRSMV DIR OPT OBS: CPT | Mod: S$GLB,,, | Performed by: EMERGENCY MEDICINE

## 2023-01-27 PROCEDURE — 80305 OOH COLLECTION ONLY DRUG SCREEN: ICD-10-PCS | Mod: S$GLB,,, | Performed by: EMERGENCY MEDICINE

## 2023-02-02 ENCOUNTER — HOSPITAL ENCOUNTER (EMERGENCY)
Facility: HOSPITAL | Age: 33
Discharge: HOME | End: 2023-02-03
Attending: EMERGENCY MEDICINE
Payer: MEDICAID

## 2023-02-02 VITALS
BODY MASS INDEX: 35.43 KG/M2 | RESPIRATION RATE: 18 BRPM | DIASTOLIC BLOOD PRESSURE: 89 MMHG | OXYGEN SATURATION: 100 % | TEMPERATURE: 99 F | SYSTOLIC BLOOD PRESSURE: 133 MMHG | HEART RATE: 84 BPM | WEIGHT: 200 LBS

## 2023-02-02 DIAGNOSIS — S80.12XA CONTUSION OF LEFT LOWER LEG, INITIAL ENCOUNTER: ICD-10-CM

## 2023-02-02 DIAGNOSIS — M25.511 ACUTE PAIN OF BOTH SHOULDERS: ICD-10-CM

## 2023-02-02 DIAGNOSIS — M25.512 ACUTE PAIN OF BOTH SHOULDERS: ICD-10-CM
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
## 2023-02-02 PROCEDURE — 99283 EMERGENCY DEPT VISIT LOW MDM: CPT | Mod: ER

## 2023-02-03 LAB
B-HCG UR QL: NEGATIVE
CTP QC/QA: YES

## 2023-02-03 PROCEDURE — 81025 URINE PREGNANCY TEST: CPT | Mod: ER | Performed by: EMERGENCY MEDICINE

## 2023-02-03 PROCEDURE — 25000003 PHARM REV CODE 250: Mod: ER | Performed by: EMERGENCY MEDICINE

## 2023-02-03 RX ORDER — KETOROLAC TROMETHAMINE 10 MG/1
10 TABLET, FILM COATED ORAL
Status: COMPLETED | OUTPATIENT
Start: 2023-02-03 | End: 2023-02-03

## 2023-02-03 RX ORDER — MELOXICAM 7.5 MG/1
7.5 TABLET ORAL DAILY
Qty: 7 TABLET | Refills: 0 | OUTPATIENT
Start: 2023-02-03 | End: 2023-02-06

## 2023-02-03 RX ADMIN — KETOROLAC TROMETHAMINE 10 MG: 10 TABLET, FILM COATED ORAL at 12:02

## 2023-02-03 NOTE — ED PROVIDER NOTES
ED Provider Note - 2/2/2023    History     Chief Complaint   Patient presents with    Fall     Pt reports she fell down steps yesterday and is still having pain to bilateral shoulder, L hip, and L leg. No deformity. Pt ambulatory to triage. Denies hitting head.     Patient currently presents with a chief complaint of injury to the bilateral shoulders as well as the L lower leg.  This was acquired on Wed as a result of tripping on the steps and ultimately falling onto her buttocks and back.  Patient notes associated symptoms of pain.  Denies associated numbness, deformity, abrasion, laceration, and loss of ROM.    Patient remains fully ambulatory on the affected extremity.      Review of patient's allergies indicates:  No Known Allergies  Past Medical History:   Diagnosis Date    Left lower quadrant pain 8/24/2016     Past Surgical History:   Procedure Laterality Date    breast reduction      LIPOSUCTION       Family History   Problem Relation Age of Onset    Kidney disease Mother     Diabetes Mother     Hypertension Mother     Heart disease Father      Social History     Tobacco Use    Smoking status: Never    Smokeless tobacco: Never   Substance Use Topics    Alcohol use: No    Drug use: No     Review of Systems   Constitutional:  Negative for chills and fever.   HENT:  Negative for congestion and rhinorrhea.    Respiratory:  Negative for cough and shortness of breath.    Cardiovascular:  Negative for chest pain and palpitations.   Gastrointestinal:  Negative for abdominal pain, diarrhea and vomiting.   Genitourinary:  Negative for difficulty urinating and dysuria.   Skin:  Negative for color change and rash.   Neurological:  Negative for dizziness and light-headedness.   Hematological:  Negative for adenopathy. Does not bruise/bleed easily.     Physical Exam     Initial Vitals   BP Pulse Resp Temp SpO2   02/02/23 2349 02/02/23 2348 02/02/23 2348 02/02/23 2348 02/02/23 2348   133/89 84 18 98.7 °F (37.1 °C) 100 %       MAP       --                Vitals:    02/02/23 2348 02/02/23 2349   BP:  133/89   Pulse: 84    Resp: 18    Temp: 98.7 °F (37.1 °C)    TempSrc: Oral    SpO2: 100%    Weight: 90.7 kg (200 lb)      Physical Exam    Nursing note and vitals reviewed.  Constitutional: She appears well-developed and well-nourished. She is not diaphoretic. No distress.   HENT:   Head: Normocephalic and atraumatic.   Nose: Nose normal.   Mouth/Throat: Oropharynx is clear and moist.   Eyes: Conjunctivae are normal. No scleral icterus.   Cardiovascular:  Normal rate, regular rhythm, normal heart sounds and intact distal pulses.           Pulmonary/Chest: Breath sounds normal. No respiratory distress.   Abdominal: Abdomen is soft. She exhibits no distension. There is no abdominal tenderness.   Musculoskeletal:         General: No edema. Normal range of motion.      Right shoulder: Tenderness present. No swelling, deformity or crepitus. Normal range of motion. Normal strength. Normal pulse.      Left shoulder: Tenderness present. No swelling, deformity or crepitus. Normal range of motion. Normal strength. Normal pulse.        Arms:       Cervical back: No spinous process tenderness or muscular tenderness.      Right lower leg: Normal.      Left lower leg: Tenderness present. No swelling, deformity, lacerations or bony tenderness.        Legs:      Neurological: She is alert and oriented to person, place, and time. She has normal strength.   Skin: Skin is warm and dry.       ED Course   Procedures                   MDM  History Acquisition     The patient's list of active medical problems, social history, medications, and allergies as documented per RN staff has been reviewed.       Differential Diagnoses   Based on available information and the initial assessment, the working differential diagnoses considered during this evaluation include but are not limited to sprain/strain, contusion, fracture, dislocation.      LABS     Labs Reviewed    POCT URINE PREGNANCY     Notable findings from our independent review of the labs ordered include None.    Imaging     Imaging Results    None            EKG        Additional Consideration   During the course of the patient's evaluation, additional testing with CT imaging of the  cervical spine was considered but deferred secondary to negative nexus criteria        Medications   ketorolac tablet 10 mg (10 mg Oral Given 2/3/23 0011)                   ED Management/Discussion   Patient remains fully ambulatory with intact strength and range of motion.    On final assessment, the patient appears well and comfortable for discharge.  I see no indication of an emergent process beyond that addressed during our encounter but have duly counseled the patient/family regarding the need for prompt follow-up as well as the indications that should prompt immediate return to the emergency room should new or worrisome developments occur.  The patient/family has been provided with verbal and printed direction regarding our final diagnosis(es) as well as instructions regarding use of OTC and/or Rx medications intended to manage the patient's aforementioned conditions including:  ED Prescriptions       Medication Sig Dispense Start Date End Date Auth. Provider    meloxicam (MOBIC) 7.5 MG tablet Take 1 tablet (7.5 mg total) by mouth once daily. for 7 days 7 tablet 2/3/2023 2/10/2023 Donavon Busch MD              Patient has been advised of the following recommended follow-up instructions:  Follow-up Information       Follow up With Specialties Details Why Contact Info    Chacha Shaikh MD Family Medicine Schedule an appointment as soon as possible for a visit  for reassessment [address]  [address]  [facility] [id number] [phone]      [facility] - Emergency Dept Emergency Medicine Go to  As needed, If symptoms worsen [address] [facility] [address] [phone]          The patient/family  communicates understanding of all this information and all remaining questions and concerns were addressed at this time.        CLINICAL IMPRESSION    ICD-10-CM ICD-9-CM   1. Fall down steps, initial encounter  W10.8XXA E880.9   2. Contusion of left lower leg, initial encounter  S80.12XA 924.10   3. Acute pain of both shoulders  M25.511 719.41    M25.512           ED Disposition Condition    Discharge Stable               Donavon Busch MD  02/03/23 0236

## 2023-02-06 ENCOUNTER — HOSPITAL ENCOUNTER (EMERGENCY)
Facility: HOSPITAL | Age: 33
Discharge: HOME | End: 2023-02-06
Attending: EMERGENCY MEDICINE
Payer: MEDICAID

## 2023-02-06 VITALS
OXYGEN SATURATION: 100 % | RESPIRATION RATE: 18 BRPM | HEIGHT: 63 IN | HEART RATE: 84 BPM | DIASTOLIC BLOOD PRESSURE: 69 MMHG | WEIGHT: 200 LBS | BODY MASS INDEX: 35.44 KG/M2 | TEMPERATURE: 98 F | SYSTOLIC BLOOD PRESSURE: 131 MMHG

## 2023-02-06 DIAGNOSIS — M25.512 ACUTE PAIN OF LEFT SHOULDER: Primary | ICD-10-CM

## 2023-02-06 DIAGNOSIS — R07.9 CHEST PAIN: ICD-10-CM

## 2023-02-06 LAB
ALBUMIN SERPL BCP-MCNC: 3.5 G/DL (ref 3.5–5.2)
ALP SERPL-CCNC: 56 U/L (ref 55–135)
ALT SERPL W/O P-5'-P-CCNC: 14 U/L (ref 10–44)
ANION GAP SERPL CALC-SCNC: 10 MMOL/L (ref 8–16)
AST SERPL-CCNC: 11 U/L (ref 10–40)
BASOPHILS # BLD AUTO: 0.03 K/UL (ref 0–0.2)
BASOPHILS NFR BLD: 0.4 % (ref 0–1.9)
BILIRUB SERPL-MCNC: 0.2 MG/DL (ref 0.1–1)
BUN SERPL-MCNC: 11 MG/DL (ref 6–20)
CALCIUM SERPL-MCNC: 9 MG/DL (ref 8.7–10.5)
CHLORIDE SERPL-SCNC: 108 MMOL/L (ref 95–110)
CO2 SERPL-SCNC: 21 MMOL/L (ref 23–29)
CREAT SERPL-MCNC: 0.9 MG/DL (ref 0.5–1.4)
DIFFERENTIAL METHOD: ABNORMAL
EOSINOPHIL # BLD AUTO: 0.1 K/UL (ref 0–0.5)
EOSINOPHIL NFR BLD: 1.4 % (ref 0–8)
ERYTHROCYTE [DISTWIDTH] IN BLOOD BY AUTOMATED COUNT: 18.6 % (ref 11.5–14.5)
EST. GFR  (NO RACE VARIABLE): >60 ML/MIN/1.73 M^2
GLUCOSE SERPL-MCNC: 121 MG/DL (ref 70–110)
HCT VFR BLD AUTO: 34 % (ref 37–48.5)
HGB BLD-MCNC: 11 G/DL (ref 12–16)
IMM GRANULOCYTES # BLD AUTO: 0.03 K/UL (ref 0–0.04)
IMM GRANULOCYTES NFR BLD AUTO: 0.4 % (ref 0–0.5)
LYMPHOCYTES # BLD AUTO: 1.7 K/UL (ref 1–4.8)
LYMPHOCYTES NFR BLD: 24.3 % (ref 18–48)
MCH RBC QN AUTO: 21.7 PG (ref 27–31)
MCHC RBC AUTO-ENTMCNC: 32.4 G/DL (ref 32–36)
MCV RBC AUTO: 67 FL (ref 82–98)
MONOCYTES # BLD AUTO: 0.6 K/UL (ref 0.3–1)
MONOCYTES NFR BLD: 8.4 % (ref 4–15)
NEUTROPHILS # BLD AUTO: 4.6 K/UL (ref 1.8–7.7)
NEUTROPHILS NFR BLD: 65.1 % (ref 38–73)
NRBC BLD-RTO: 0 /100 WBC
PLATELET # BLD AUTO: 285 K/UL (ref 150–450)
PMV BLD AUTO: 10.8 FL (ref 9.2–12.9)
POTASSIUM SERPL-SCNC: 4.3 MMOL/L (ref 3.5–5.1)
PROT SERPL-MCNC: 7.4 G/DL (ref 6–8.4)
RBC # BLD AUTO: 5.08 M/UL (ref 4–5.4)
SODIUM SERPL-SCNC: 139 MMOL/L (ref 136–145)
TROPONIN I SERPL DL<=0.01 NG/ML-MCNC: <0.006 NG/ML (ref 0–0.03)
WBC # BLD AUTO: 7 K/UL (ref 3.9–12.7)

## 2023-02-06 PROCEDURE — 80053 COMPREHEN METABOLIC PANEL: CPT | Performed by: EMERGENCY MEDICINE

## 2023-02-06 PROCEDURE — 84484 ASSAY OF TROPONIN QUANT: CPT | Performed by: EMERGENCY MEDICINE

## 2023-02-06 PROCEDURE — 63600175 PHARM REV CODE 636 W HCPCS: Performed by: EMERGENCY MEDICINE

## 2023-02-06 PROCEDURE — 85025 COMPLETE CBC W/AUTO DIFF WBC: CPT | Performed by: EMERGENCY MEDICINE

## 2023-02-06 PROCEDURE — 93010 ELECTROCARDIOGRAM REPORT: CPT | Mod: ,,, | Performed by: INTERNAL MEDICINE

## 2023-02-06 PROCEDURE — 93005 ELECTROCARDIOGRAM TRACING: CPT

## 2023-02-06 PROCEDURE — 99285 EMERGENCY DEPT VISIT HI MDM: CPT | Mod: 25

## 2023-02-06 PROCEDURE — 93010 EKG 12-LEAD: ICD-10-PCS | Mod: ,,, | Performed by: INTERNAL MEDICINE

## 2023-02-06 PROCEDURE — 96374 THER/PROPH/DIAG INJ IV PUSH: CPT

## 2023-02-06 RX ORDER — IBUPROFEN 600 MG/1
600 TABLET ORAL EVERY 6 HOURS PRN
Qty: 20 TABLET | Refills: 0 | Status: SHIPPED | OUTPATIENT
Start: 2023-02-06

## 2023-02-06 RX ORDER — KETOROLAC TROMETHAMINE 30 MG/ML
15 INJECTION, SOLUTION INTRAMUSCULAR; INTRAVENOUS
Status: COMPLETED | OUTPATIENT
Start: 2023-02-06 | End: 2023-02-06

## 2023-02-06 RX ORDER — METHOCARBAMOL 750 MG/1
1500 TABLET, FILM COATED ORAL 3 TIMES DAILY
Qty: 30 TABLET | Refills: 0 | Status: SHIPPED | OUTPATIENT
Start: 2023-02-06 | End: 2023-02-11

## 2023-02-06 RX ADMIN — KETOROLAC TROMETHAMINE 15 MG: 30 INJECTION, SOLUTION INTRAMUSCULAR; INTRAVENOUS at 06:02

## 2023-02-06 NOTE — ED PROVIDER NOTES
Encounter Date: 2/6/2023    SCRIBE #1 NOTE: I, Rob Fernandez, am scribing for, and in the presence of,  John Tran MD. I have scribed the following portions of the note - Other sections scribed: HPI, ROS, Physical Exam.     History     Chief Complaint   Patient presents with    Chest Pain    Arm Injury    Shortness of Breath     Chest pain and SOB x 2 weeks. Reports pain radiates down left arm x 4 days. States unable to lift arm on her own. Reports thumping feeling in arm. Pt treated w/ pain medication at another facility two days ago.     Amy Davis is a 32 y.o. female who presents to the emergency department for evaluation of chest pain, onset 2 weeks ago. The patient reports left-sided chest pain and shortness of breath that has been ongoing for 2 weeks. She also reports left shoulder pain that started 4 days ago and radiates down the arm. Patient was seen at [facility] 4 days ago after falling down the stairs. She denies relief from the pain medications she was given at that time.    The history is provided by the patient. No  was used.   Review of patient's allergies indicates:  No Known Allergies  Past Medical History:   Diagnosis Date    Left lower quadrant pain 8/24/2016     Past Surgical History:   Procedure Laterality Date    breast reduction      LIPOSUCTION       Family History   Problem Relation Age of Onset    Kidney disease Mother     Diabetes Mother     Hypertension Mother     Heart disease Father      Social History     Tobacco Use    Smoking status: Never    Smokeless tobacco: Never   Substance Use Topics    Alcohol use: No    Drug use: No     Review of Systems   Respiratory:  Positive for shortness of breath.    Cardiovascular:  Positive for chest pain.   Musculoskeletal:  Positive for arthralgias.   All other systems reviewed and are negative.    Physical Exam     Initial Vitals [02/06/23 0610]   BP Pulse Resp Temp SpO2   134/73 97 20 98.4 °F (36.9 °C)  100 %      MAP       --         Physical Exam    Constitutional: She appears well-developed and well-nourished. No distress.   HENT:   Head: Normocephalic and atraumatic.   Eyes: Conjunctivae and EOM are normal. Pupils are equal, round, and reactive to light.   Neck: Neck supple.   Normal range of motion.  Cardiovascular:  Normal rate, regular rhythm, normal heart sounds and intact distal pulses.           Pulmonary/Chest: Breath sounds normal. No respiratory distress. She exhibits tenderness.   Tenderness of the left upper sternal border.   Musculoskeletal:         General: Tenderness present.      Left shoulder: Tenderness present. Decreased range of motion (due to pain).      Cervical back: Normal range of motion and neck supple.     Neurological: She is alert and oriented to person, place, and time. She has normal strength.   Skin: Skin is warm and dry.   Psychiatric: She has a normal mood and affect. Her behavior is normal. Judgment and thought content normal.       ED Course   Procedures  Labs Reviewed   CBC W/ AUTO DIFFERENTIAL - Abnormal; Notable for the following components:       Result Value    Hemoglobin 11.0 (*)     Hematocrit 34.0 (*)     MCV 67 (*)     MCH 21.7 (*)     RDW 18.6 (*)     All other components within normal limits   COMPREHENSIVE METABOLIC PANEL - Abnormal; Notable for the following components:    CO2 21 (*)     Glucose 121 (*)     All other components within normal limits   TROPONIN I     EKG Readings: (Independently Interpreted)   Initial Reading: No STEMI. Rhythm: Normal Sinus Rhythm. Heart Rate: 96. ST Segments: Normal ST Segments. T Waves: Normal.     Imaging Results              X-Ray Shoulder 2 or More Views Left (Final result)  Result time 02/06/23 06:57:27      Final result by Jordan Cristobal MD (02/06/23 06:57:27)                   Impression:      No evidence of fracture.No significant degenerative changes.      Electronically signed by: Jordan Cristobal  MD  Date:    02/06/2023  Time:    06:57               Narrative:    EXAMINATION:  XR SHOULDER COMPLETE 2 OR MORE VIEWS LEFT    CLINICAL HISTORY:  Left shoulder pain;    TECHNIQUE:  Three-view examination    Technologist notes: Pt refused UPT, stated no possibility of pregnancy, shielded appropriately for exam. Pt unable to perform either the axillary or the valpeau method. Grashey included.    COMPARISON:  01/03/2018.    FINDINGS:  The alignment is within normal limits.  No displaced fractures identified.  No evidence of lytic or blastic lesions.Joint spaces are unremarkable.Soft tissues are unremarkable.                                       X-Ray Chest 1 View (Final result)  Result time 02/06/23 06:57:58      Final result by Jordan Cristobal MD (02/06/23 06:57:58)                   Impression:      No acute abnormality.      Electronically signed by: Jordan Cristobal MD  Date:    02/06/2023  Time:    06:57               Narrative:    EXAMINATION:  XR CHEST 1 VIEW    CLINICAL HISTORY:  Chest pain;    TECHNIQUE:  Single frontal view of the chest was performed.    COMPARISON:  02/08/2022    FINDINGS:  The lungs are clear with normal appearance of pulmonary vasculature. No pleural effusion. No evident pneumothorax.    The cardiac silhouette is normal in size. The hilar and mediastinal contours are unremarkable.    Bones are intact.                                       Medications   ketorolac injection 15 mg (15 mg Intravenous Given 2/6/23 0635)     Medical Decision Making:   Initial Assessment:   Amy Davis is a 32 y.o. female who presents to the emergency department for evaluation of chest pain, onset 2 weeks ago.   Differential Diagnosis:   Differential Diagnosis includes, but is not limited to:  ACS/MI, PE, aortic dissection, pneumothorax, pericarditis/myocarditis, pneumonia, infection/abscess, lung mass, trauma/fracture, costochondritis/pleurisy, MSK pain/contusion.    Independently Interpreted  Test(s):   I have ordered and independently interpreted X-rays - see summary below.       <> Summary of X-Ray Reading(s): Chest x-ray without acute cardiopulmonary abnormality.  Clinical Tests:   Lab Tests: Ordered and Reviewed       <> Summary of Lab: CBC shows a hemoglobin of 11.0 and hematocrit of 34.0.  CMP shows a glucose of 121.  Troponin is 0.    Radiological Study: Ordered and Reviewed  Medical Tests: Ordered and Reviewed  ED Management:  EKG is normal.  Troponin is 0.  Chest x-ray is negative.  I do not suspect any emergent medical condition.  Patient's symptoms are most consistent with muscular pain.  I will place on ibuprofen and Robaxin and suggest close follow-up with the primary physician.  She may return to the emergency department for any possible worsening.  Additional MDM:     Well's Criteria Score:  -Clinical symptoms of DVT (leg swelling, pain with palpation) = 0.0  -Other diagnosis less likely than pulmonary embolism =            0.0  -Heart Rate >100 =   0.0  -Immobilization (= or > than 3 days) or surgery in the previous 4 weeks = 0.0  -Previous DVT/PE = 0.0  -Hemoptysis =          0.0  -Malignancy =           0.0  Well's Probability Score =    0         Scribe Attestation:   Scribe #1: I performed the above scribed service and the documentation accurately describes the services I performed. I attest to the accuracy of the note.                   Clinical Impression:   Final diagnoses:  [R07.9] Chest pain  [M25.512] Acute pain of left shoulder (Primary)        ED Disposition Condition    Discharge Stable          ED Prescriptions       Medication Sig Dispense Start Date End Date Auth. Provider    ibuprofen (ADVIL,MOTRIN) 600 MG tablet Take 1 tablet (600 mg total) by mouth every 6 (six) hours as needed for Pain. 20 tablet 2/6/2023 -- John Tran MD    methocarbamoL (ROBAXIN) 750 MG Tab Take 2 tablets (1,500 mg total) by mouth 3 (three) times daily. for 5 days 30 tablet 2/6/2023  2/11/2023 John Tran MD          Follow-up Information       Follow up With Specialties Details Why Contact Info    Chacha Shaikh MD Family Medicine Schedule an appointment as soon as possible for a visit   [address]  [address]  [facility] [address] [phone]          I, Dr. John Tran, personally performed the services described in this documentation. All medical record entries made by the scribe were at my direction and in my presence. I have reviewed the chart and agree that the record reflects my personal performance and is accurate and complete. John Tran MD.  9:06 AM 02/06/2023       John Tran MD  02/06/23 0908

## 2023-02-06 NOTE — ED NOTES
Patient stated that she did not fall and hit her chest two  weeks ago. Reports that the pain in her left arm is still there and no improvement. Will reassess in 30 min to allow medication to work. VSS, bed locked and in low position, call light at bedside

## 2023-02-06 NOTE — ED NOTES
See triage note for HPI.    APPEARANCE: Alert, oriented and in no acute distress.  CARDIAC: Chest pain. Hypertension. Normal rate.  PERIPHERAL VASCULAR: Normal cap refill. No edema. Warm to touch.    RESPIRATORY: SOB. Respirations are even and unlabored. Normal rate. Symmetrical chest expansion bilaterally. No obvious signs of distress.  GASTRO: Abdomen soft, non-tender, no distention.  MUSC: Arm pain. No obvious deformity.  SKIN: Skin is warm and dry.  NEURO: Dyana coma scale: eyes open spontaneously-4, oriented & converses-5, obeys commands-6. No neurological abnormalities.   MENTAL STATUS: Awake, alert and aware of environment.

## 2023-02-06 NOTE — ED NOTES
Pt refused urine pregnancy test stating she is on birth control and took a test 3 days ago and it was negative.

## 2023-02-07 ENCOUNTER — PATIENT OUTREACH (OUTPATIENT)
Dept: EMERGENCY MEDICINE | Facility: HOSPITAL | Age: 33
End: 2023-02-07
Payer: MEDICAID

## 2023-04-11 ENCOUNTER — PATIENT MESSAGE (OUTPATIENT)
Dept: ADMINISTRATIVE | Facility: HOSPITAL | Age: 33
End: 2023-04-11
Payer: MEDICAID

## 2023-10-27 ENCOUNTER — OCCUPATIONAL HEALTH (OUTPATIENT)
Dept: URGENT CARE | Facility: CLINIC | Age: 33
End: 2023-10-27

## 2023-10-27 VITALS — BODY MASS INDEX: 34.2 KG/M2 | HEIGHT: 63 IN | WEIGHT: 193 LBS

## 2023-10-27 DIAGNOSIS — Z02.1 PRE-EMPLOYMENT EXAMINATION: Primary | ICD-10-CM

## 2023-10-27 PROCEDURE — 99499 UNLISTED E&M SERVICE: CPT | Mod: S$GLB,,, | Performed by: STUDENT IN AN ORGANIZED HEALTH CARE EDUCATION/TRAINING PROGRAM

## 2023-10-27 PROCEDURE — 99499 DOT PHYSICAL: ICD-10-PCS | Mod: S$GLB,,, | Performed by: STUDENT IN AN ORGANIZED HEALTH CARE EDUCATION/TRAINING PROGRAM

## 2023-12-27 ENCOUNTER — HOSPITAL ENCOUNTER (EMERGENCY)
Facility: HOSPITAL | Age: 33
Discharge: HOME | End: 2023-12-27
Attending: EMERGENCY MEDICINE
Payer: MEDICAID

## 2023-12-27 VITALS
HEIGHT: 63 IN | DIASTOLIC BLOOD PRESSURE: 86 MMHG | SYSTOLIC BLOOD PRESSURE: 133 MMHG | RESPIRATION RATE: 16 BRPM | TEMPERATURE: 98 F | BODY MASS INDEX: 35.44 KG/M2 | WEIGHT: 200 LBS | HEART RATE: 82 BPM | OXYGEN SATURATION: 98 %

## 2023-12-27 DIAGNOSIS — M54.50 ACUTE BILATERAL LOW BACK PAIN WITHOUT SCIATICA: ICD-10-CM
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
## 2023-12-27 DIAGNOSIS — M25.511 RIGHT SHOULDER PAIN: ICD-10-CM

## 2023-12-27 LAB
B-HCG UR QL: NEGATIVE
CTP QC/QA: YES

## 2023-12-27 PROCEDURE — 99284 EMERGENCY DEPT VISIT MOD MDM: CPT | Mod: ER

## 2023-12-27 PROCEDURE — 81025 URINE PREGNANCY TEST: CPT | Mod: ER | Performed by: EMERGENCY MEDICINE

## 2023-12-27 RX ORDER — METHOCARBAMOL 500 MG/1
500 TABLET, FILM COATED ORAL
Status: DISCONTINUED | OUTPATIENT
Start: 2023-12-27 | End: 2023-12-27

## 2023-12-27 RX ORDER — METHOCARBAMOL 750 MG/1
750 TABLET, FILM COATED ORAL 4 TIMES DAILY
Qty: 20 TABLET | Refills: 0 | Status: SHIPPED | OUTPATIENT
Start: 2023-12-27 | End: 2024-01-01

## 2023-12-27 RX ORDER — NAPROXEN 500 MG/1
500 TABLET ORAL 2 TIMES DAILY WITH MEALS
Qty: 30 TABLET | Refills: 0 | Status: SHIPPED | OUTPATIENT
Start: 2023-12-27

## 2023-12-27 RX ORDER — KETOROLAC TROMETHAMINE 30 MG/ML
15 INJECTION, SOLUTION INTRAMUSCULAR; INTRAVENOUS
Status: DISCONTINUED | OUTPATIENT
Start: 2023-12-27 | End: 2023-12-27

## 2023-12-27 NOTE — Clinical Note
"Amy Navarro" Ryan was seen and treated in our emergency department on 12/27/2023.  She may return to work on 12/29/2023.       If you have any questions or concerns, please don't hesitate to call.      Amy Bridges PA-C"
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
## 2023-12-28 NOTE — DISCHARGE INSTRUCTIONS
It was nice meeting you, and I hope you feel better soon. You may return to the ER at any time for any new or concerning symptoms, worsening condition, or failure to improve.    Our goal in the ER is to always give you outstanding care and exceptional service. You may receive a survey by mail or email in the next week about your experience in our ED. We would greatly appreciate you completing and returning the survey. Your feedback provides us with a way to recognize our staff who give very good care and it helps us learn how to improve when your experience was below our aspiration of excellence.     Sincerely,     Amy Bridges PA-C  Emergency Room Physician Assistant  [facility]
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
## 2024-12-15 ENCOUNTER — HOSPITAL ENCOUNTER (EMERGENCY)
Facility: HOSPITAL | Age: 34
Discharge: HOME | End: 2024-12-15
Attending: EMERGENCY MEDICINE

## 2024-12-15 VITALS
WEIGHT: 200 LBS | BODY MASS INDEX: 34.15 KG/M2 | DIASTOLIC BLOOD PRESSURE: 85 MMHG | HEART RATE: 91 BPM | OXYGEN SATURATION: 100 % | TEMPERATURE: 99 F | HEIGHT: 64 IN | RESPIRATION RATE: 16 BRPM | SYSTOLIC BLOOD PRESSURE: 131 MMHG

## 2024-12-15 DIAGNOSIS — M25.411 PAIN AND SWELLING OF RIGHT SHOULDER: Primary | ICD-10-CM

## 2024-12-15 DIAGNOSIS — M25.511 PAIN AND SWELLING OF RIGHT SHOULDER: Primary | ICD-10-CM

## 2024-12-15 DIAGNOSIS — M25.511 RIGHT SHOULDER PAIN: ICD-10-CM

## 2024-12-15 LAB
B-HCG UR QL: NEGATIVE
CTP QC/QA: YES

## 2024-12-15 PROCEDURE — 99284 EMERGENCY DEPT VISIT MOD MDM: CPT | Mod: 25,ER

## 2024-12-15 PROCEDURE — 63600175 PHARM REV CODE 636 W HCPCS: Mod: ER | Performed by: EMERGENCY MEDICINE

## 2024-12-15 PROCEDURE — 81025 URINE PREGNANCY TEST: CPT | Mod: ER | Performed by: EMERGENCY MEDICINE

## 2024-12-15 PROCEDURE — 96372 THER/PROPH/DIAG INJ SC/IM: CPT | Performed by: EMERGENCY MEDICINE

## 2024-12-15 RX ORDER — KETOROLAC TROMETHAMINE 30 MG/ML
15 INJECTION, SOLUTION INTRAMUSCULAR; INTRAVENOUS
Status: COMPLETED | OUTPATIENT
Start: 2024-12-15 | End: 2024-12-15

## 2024-12-15 RX ORDER — DICLOFENAC SODIUM 75 MG/1
75 TABLET, DELAYED RELEASE ORAL 2 TIMES DAILY
Qty: 60 TABLET | Refills: 0 | Status: SHIPPED | OUTPATIENT
Start: 2024-12-15

## 2024-12-15 RX ORDER — CYCLOBENZAPRINE HCL 10 MG
10 TABLET ORAL 3 TIMES DAILY PRN
Qty: 15 TABLET | Refills: 0 | Status: SHIPPED | OUTPATIENT
Start: 2024-12-15 | End: 2024-12-20

## 2024-12-15 RX ORDER — DEXAMETHASONE SODIUM PHOSPHATE 4 MG/ML
8 INJECTION, SOLUTION INTRA-ARTICULAR; INTRALESIONAL; INTRAMUSCULAR; INTRAVENOUS; SOFT TISSUE
Status: COMPLETED | OUTPATIENT
Start: 2024-12-15 | End: 2024-12-15

## 2024-12-15 RX ADMIN — KETOROLAC TROMETHAMINE 15 MG: 30 INJECTION, SOLUTION INTRAMUSCULAR; INTRAVENOUS at 11:12

## 2024-12-15 RX ADMIN — DEXAMETHASONE SODIUM PHOSPHATE 8 MG: 4 INJECTION, SOLUTION INTRA-ARTICULAR; INTRALESIONAL; INTRAMUSCULAR; INTRAVENOUS; SOFT TISSUE at 11:12

## 2024-12-15 NOTE — Clinical Note
"Amy Issadelmi Davis was seen and treated in our emergency department on 12/15/2024.  She may return to school on 12/17/2024.      If you have any questions or concerns, please don't hesitate to call.       LAZARO"

## 2024-12-15 NOTE — Clinical Note
"Amy "Ivory Davis was seen and treated in our emergency department on 12/15/2024.  She may return to work on 12/17/2024.       If you have any questions or concerns, please don't hesitate to call.      Tera Fair, MSN, CAPA, CCHP-RN, MALIHA, CPAN, CCRN, RN    "

## 2024-12-16 NOTE — ED PROVIDER NOTES
Encounter Date: 12/15/2024       History     Chief Complaint   Patient presents with    Mass     Pt states that she has a lump that started growing in her right arm a month ago. It has progressively gotten bigger. Now complaining of extremity weakness and decreased range of motion.      HPI  33 y.o.   Co lump to R deltoid area x 1 month, worsening, now sometimes gets paresthesias in R arm  Denies injury, denies vaccinations/injections to site  No fevers  No other extremities    Review of patient's allergies indicates:  No Known Allergies  Past Medical History:   Diagnosis Date    Left lower quadrant pain 8/24/2016     Past Surgical History:   Procedure Laterality Date    breast reduction      LIPOSUCTION       Family History   Problem Relation Name Age of Onset    Kidney disease Mother      Diabetes Mother      Hypertension Mother      Heart disease Father       Social History     Tobacco Use    Smoking status: Never    Smokeless tobacco: Never   Substance Use Topics    Alcohol use: No    Drug use: No     Review of Systems  All systems were reviewed/examined and were negative except as noted in the HPI.    Physical Exam     Initial Vitals [12/15/24 2152]   BP Pulse Resp Temp SpO2   131/85 91 16 98.5 °F (36.9 °C) 100 %      MAP       --         Physical Exam    General: the patient is awake, alert, and in no apparent distress.  Head: normocephalic and atraumatic, sclera are clear  Neck: supple without meningismus  Chest: no respiratory distress  Heart: regular rate and rhythm  R deltoid area palpable nodule, not fluctuant, able to range shoulder  No other gross deformity  Hand nvi to m/u/r n  Extremities: warm and well perfused  Skin: warm and dry  Psych conversant  Neuro: awake, alert, moving all extremities    ED Course   Procedures  Labs Reviewed   POCT URINE PREGNANCY       Result Value    POC Preg Test, Ur Negative       Acceptable Yes            Imaging Results              X-Ray Humerus 2 View  Right (Final result)  Result time 12/15/24 22:39:15      Final result by Deni Yoder MD (12/15/24 22:39:15)                   Impression:      No acute abnormality identified.      Electronically signed by: Deni Yoder  Date:    12/15/2024  Time:    22:39               Narrative:    EXAMINATION:  XR HUMERUS 2 VIEW RIGHT    CLINICAL HISTORY:  right arm pn;    TECHNIQUE:  Three views right humerus radiographs.    COMPARISON:  None    FINDINGS:  No acute displaced fracture is identified.  No traumatic malalignment.  No osseous destructive process.                                       X-Ray Shoulder Trauma Right (Final result)  Result time 12/15/24 22:41:09      Final result by Deni Yoder MD (12/15/24 22:41:09)                   Impression:      As above.      Electronically signed by: Deni Yoder  Date:    12/15/2024  Time:    22:41               Narrative:    EXAMINATION:  XR SHOULDER TRAUMA 3 VIEW RIGHT    CLINICAL HISTORY:  Pain in right shoulder    TECHNIQUE:  Three or four views of the right shoulder were performed.    COMPARISON:  None    FINDINGS:  No fracture.  No traumatic malalignment.  No osseous destructive process.  Mild degenerative change.                                       Medications   ketorolac injection 15 mg (15 mg Intramuscular Given 12/15/24 2317)   dexAMETHasone injection 8 mg (8 mg Intramuscular Given 12/15/24 2317)     Medical Decision Making                      Medical Decision Making:    This is an emergent evaluation of a patient presenting to the ED.  Nursing notes were reviewed.  Xr shoulder/humerus neg.  I personally reviewed and interpreted the laboratory results.    I decided to obtain and review old medical records, which showed: well care    Evaluation for Emergency Medical Condition  The patient received a medical screening exam and within a reasonable degree of clinical confidence an emergency medical condition has not been identified.  The patient is  instructed on proper follow up and return precautions to the ED.    Ref ortho    Boaz Fernandez MD, NICOLE                Clinical Impression:  Final diagnoses:  [M25.511] Right shoulder pain  [M25.511, M25.411] Pain and swelling of right shoulder (Primary)          ED Disposition Condition    Discharge Stable          ED Prescriptions       Medication Sig Dispense Start Date End Date Auth. Provider    diclofenac (VOLTAREN) 75 MG EC tablet Take 1 tablet (75 mg total) by mouth 2 (two) times daily. 60 tablet 12/15/2024 -- Chinmay Fernandez MD    cyclobenzaprine (FLEXERIL) 10 MG tablet Take 1 tablet (10 mg total) by mouth 3 (three) times daily as needed for Muscle spasms. 15 tablet 12/15/2024 12/20/2024 Chinmay Fernandez MD          Follow-up Information       Follow up With Specialties Details Why Contact Info Additional Information    Chacha Shaikh MD Family Medicine Schedule an appointment as soon as possible for a visit   [address]  [address]  [facility] [id number] [phone]       [facility] Orthopedics Schedule an appointment as soon as possible for a visit   [address]  [address]  [facility] [address] [phone] Please park in [address] or [address] and use [address] grimes. Take Medical Office Bldg. elevators.          Discharged to home in stable condition, return to ED warnings given, follow up and patient care instructions given.      Boaz Fernandez MD, NICOLE, FACEP  Department of Emergency Medicine       Chinmay Fernandez MD  12/16/24 0129     98

## 2024-12-16 NOTE — ED NOTES
Assumed care of pt who came in reporting R-upper anterior arm pain now 8/10 X 1 month that gradually worsened of over the past 3 days. There is a palpable lump to that site that is firm, tender, and non mobile. No obs swelling to the arm or lower portion of the arm. She has good CMS to the arm. No deformity to the arm otherwise. Will cont to monitor.

## 2025-02-02 ENCOUNTER — TELEPHONE (OUTPATIENT)
Dept: SPORTS MEDICINE | Facility: CLINIC | Age: 35
End: 2025-02-02

## 2025-02-02 DIAGNOSIS — M25.511 RIGHT SHOULDER PAIN, UNSPECIFIED CHRONICITY: Primary | ICD-10-CM

## 2025-04-24 ENCOUNTER — HOSPITAL ENCOUNTER (EMERGENCY)
Facility: HOSPITAL | Age: 35
Discharge: HOME | End: 2025-04-24
Attending: FAMILY MEDICINE
Payer: COMMERCIAL

## 2025-04-24 VITALS
SYSTOLIC BLOOD PRESSURE: 127 MMHG | HEART RATE: 110 BPM | HEIGHT: 64 IN | DIASTOLIC BLOOD PRESSURE: 85 MMHG | TEMPERATURE: 98 F | OXYGEN SATURATION: 99 % | WEIGHT: 201 LBS | RESPIRATION RATE: 20 BRPM | BODY MASS INDEX: 34.31 KG/M2

## 2025-04-24 DIAGNOSIS — M79.651 PAIN OF RIGHT THIGH: Primary | ICD-10-CM

## 2025-04-24 PROCEDURE — 63600175 PHARM REV CODE 636 W HCPCS: Mod: JZ,TB,ER | Performed by: FAMILY MEDICINE

## 2025-04-24 PROCEDURE — 96372 THER/PROPH/DIAG INJ SC/IM: CPT | Performed by: FAMILY MEDICINE

## 2025-04-24 PROCEDURE — 99284 EMERGENCY DEPT VISIT MOD MDM: CPT | Mod: 25,ER

## 2025-04-24 RX ORDER — CYCLOBENZAPRINE HCL 10 MG
10 TABLET ORAL 3 TIMES DAILY PRN
Qty: 30 TABLET | Refills: 0 | Status: SHIPPED | OUTPATIENT
Start: 2025-04-24

## 2025-04-24 RX ORDER — NAPROXEN 500 MG/1
500 TABLET ORAL 2 TIMES DAILY WITH MEALS
Qty: 30 TABLET | Refills: 0 | Status: SHIPPED | OUTPATIENT
Start: 2025-04-24

## 2025-04-24 RX ORDER — KETOROLAC TROMETHAMINE 30 MG/ML
60 INJECTION, SOLUTION INTRAMUSCULAR; INTRAVENOUS
Status: COMPLETED | OUTPATIENT
Start: 2025-04-24 | End: 2025-04-24

## 2025-04-24 RX ADMIN — KETOROLAC TROMETHAMINE 60 MG: 30 INJECTION, SOLUTION INTRAMUSCULAR at 12:04

## 2025-04-24 NOTE — Clinical Note
"Amy"Ivory Davis was seen and treated in our emergency department on 4/24/2025.  She may return to work on 04/27/2025.       If you have any questions or concerns, please don't hesitate to call.      Joni Arreguin MD"

## 2025-04-24 NOTE — ED PROVIDER NOTES
"Encounter Date: 4/24/2025       History     Chief Complaint   Patient presents with    Leg Pain     Pt reports "My leg locked and it's like I can't move it. It has a tingling feeling." Pt reports right leg pain. Muscle relaxer for symptoms without relief. Denies falls or injuries.      34-year-old female complains of right thigh pain since yesterday morning.  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  Took muscle relaxer with no improvement.  Denies direct injury.  Patient drove her car by herself to reach to ED.    The history is provided by the patient.     Review of patient's allergies indicates:  No Known Allergies  Past Medical History:   Diagnosis Date    Left lower quadrant pain 8/24/2016     Past Surgical History:   Procedure Laterality Date    breast reduction      LIPOSUCTION       Family History   Problem Relation Name Age of Onset    Kidney disease Mother      Diabetes Mother      Hypertension Mother      Heart disease Father       Social History[1]  Review of Systems   All other systems reviewed and are negative.      Physical Exam     Initial Vitals [04/24/25 1116]   BP Pulse Resp Temp SpO2   127/85 110 20 98.4 °F (36.9 °C) 99 %      MAP       --         Physical Exam    Nursing note and vitals reviewed.  Constitutional: Vital signs are normal. She appears well-developed and well-nourished. She is active. No distress.   HENT:   Head: Normocephalic.   Nose: Nose normal. Mouth/Throat: Oropharynx is clear and moist and mucous membranes are normal.   Eyes: Conjunctivae, EOM and lids are normal.   Neck: Neck supple.   Normal range of motion.  Cardiovascular:  Normal rate, regular rhythm, S1 normal, S2 normal and normal heart sounds.           Pulmonary/Chest: Breath sounds normal. No respiratory distress. She has no wheezes. She has no rhonchi. She has no rales.   Abdominal: Abdomen is soft.   Musculoskeletal:      Right upper arm: Normal.      Left upper arm: Normal.      " Cervical back: Normal range of motion and neck supple.      Left lower leg: Normal.      Comments: Right lateral thigh pain and tenderness.  No discoloration.  No sensory deficits.     Neurological: She is alert and oriented to person, place, and time. GCS score is 15. GCS eye subscore is 4. GCS verbal subscore is 5. GCS motor subscore is 6.   Skin: Skin is warm. Capillary refill takes less than 2 seconds.   Psychiatric: She has a normal mood and affect. Her speech is normal and behavior is normal. Thought content normal. Cognition and memory are normal.         ED Course   Procedures  Labs Reviewed   POCT URINE PREGNANCY          Imaging Results    None          Medications   ketorolac injection 60 mg (60 mg Intramuscular Given 4/24/25 1207)     Medical Decision Making  Right thigh- muscle cramps, muscle strain, tendinitis.    Patient declined urine sample.  Offered Toradol.  Patient initially declined.  Then accepted pain short as per nurse.  Ice rest, pain medication, muscle relaxer.  Follow up PCP.    Amount and/or Complexity of Data Reviewed  Labs: ordered.    Risk  Prescription drug management.                                      Clinical Impression:  Final diagnoses:  [M79.651] Pain of right thigh (Primary)          ED Disposition Condition    Discharge Fair          ED Prescriptions       Medication Sig Dispense Start Date End Date Auth. Provider    naproxen (NAPROSYN) 500 MG tablet Take 1 tablet (500 mg total) by mouth 2 (two) times daily with meals. 30 tablet 4/24/2025 -- Joni Arreguin MD    cyclobenzaprine (FLEXERIL) 10 MG tablet Take 1 tablet (10 mg total) by mouth 3 (three) times daily as needed. 30 tablet 4/24/2025 -- Joni Arreguin MD          Follow-up Information       Follow up With Specialties Details Why Contact Info    Chacha Shaikh MD Family Medicine Schedule an appointment as soon as possible for a visit in 2 days For  re-check [address]  [address]  [place]  [address]  [phone]                 [1]   Social History  Tobacco Use    Smoking status: Never    Smokeless tobacco: Never   Substance Use Topics    Alcohol use: No    Drug use: No        Joni Arreguin MD  04/24/25 1216